# Patient Record
Sex: MALE | Race: WHITE | NOT HISPANIC OR LATINO | Employment: FULL TIME | URBAN - METROPOLITAN AREA
[De-identification: names, ages, dates, MRNs, and addresses within clinical notes are randomized per-mention and may not be internally consistent; named-entity substitution may affect disease eponyms.]

---

## 2017-01-06 RX ORDER — METHYLPREDNISOLONE SODIUM SUCCINATE 125 MG/2ML
125 INJECTION, POWDER, LYOPHILIZED, FOR SOLUTION INTRAMUSCULAR; INTRAVENOUS ONCE
Status: COMPLETED | OUTPATIENT
Start: 2017-01-09 | End: 2017-01-09

## 2017-01-06 RX ORDER — SODIUM CHLORIDE 9 MG/ML
20 INJECTION, SOLUTION INTRAVENOUS ONCE
Status: COMPLETED | OUTPATIENT
Start: 2017-01-09 | End: 2017-01-09

## 2017-01-06 RX ORDER — DIPHENHYDRAMINE HCL 25 MG
25 TABLET ORAL ONCE
Status: COMPLETED | OUTPATIENT
Start: 2017-01-09 | End: 2017-01-09

## 2017-01-06 RX ORDER — ACETAMINOPHEN 325 MG/1
650 TABLET ORAL ONCE
Status: COMPLETED | OUTPATIENT
Start: 2017-01-09 | End: 2017-01-09

## 2017-01-09 ENCOUNTER — HOSPITAL ENCOUNTER (OUTPATIENT)
Dept: INFUSION CENTER | Facility: HOSPITAL | Age: 52
Discharge: HOME | End: 2017-01-09
Payer: COMMERCIAL

## 2017-01-09 VITALS — DIASTOLIC BLOOD PRESSURE: 62 MMHG | TEMPERATURE: 96.2 F | HEART RATE: 72 BPM | SYSTOLIC BLOOD PRESSURE: 114 MMHG

## 2017-01-09 PROCEDURE — 96413 CHEMO IV INFUSION 1 HR: CPT

## 2017-01-09 PROCEDURE — 96415 CHEMO IV INFUSION ADDL HR: CPT

## 2017-01-09 PROCEDURE — 96375 TX/PRO/DX INJ NEW DRUG ADDON: CPT

## 2017-01-09 RX ADMIN — METHYLPREDNISOLONE SODIUM SUCCINATE 125 MG: 125 INJECTION, POWDER, FOR SOLUTION INTRAMUSCULAR; INTRAVENOUS at 08:57

## 2017-01-09 RX ADMIN — SODIUM CHLORIDE 20 ML/HR: 0.9 INJECTION, SOLUTION INTRAVENOUS at 08:57

## 2017-01-09 RX ADMIN — ACETAMINOPHEN 650 MG: 325 TABLET, FILM COATED ORAL at 08:51

## 2017-01-09 RX ADMIN — DIPHENHYDRAMINE HCL 25 MG: 25 TABLET ORAL at 08:52

## 2017-01-09 NOTE — PLAN OF CARE
Problem: Potential for Falls  Goal: Patient will remain free of falls  INTERVENTIONS:  - Assess patient frequently for physical needs  - Identify cognitive and physical deficits and behaviors that affect risk of falls    - [place] fall precautions as indicated by assessment   - Educate patient/family on patient safety including physical limitations  - Instruct patient to call for assistance with activity based on assessment  - Modify environment to reduce risk of injury  - Consider OT/PT consult to assist with strengthening/mobility   Outcome: Progressing

## 2017-01-20 RX ORDER — DIPHENHYDRAMINE HCL 25 MG
25 TABLET ORAL ONCE
Status: COMPLETED | OUTPATIENT
Start: 2017-01-23 | End: 2017-01-23

## 2017-01-20 RX ORDER — METHYLPREDNISOLONE SODIUM SUCCINATE 125 MG/2ML
125 INJECTION, POWDER, LYOPHILIZED, FOR SOLUTION INTRAMUSCULAR; INTRAVENOUS ONCE
Status: COMPLETED | OUTPATIENT
Start: 2017-01-23 | End: 2017-01-23

## 2017-01-20 RX ORDER — ACETAMINOPHEN 325 MG/1
650 TABLET ORAL ONCE
Status: COMPLETED | OUTPATIENT
Start: 2017-01-23 | End: 2017-01-23

## 2017-01-20 RX ORDER — SODIUM CHLORIDE 9 MG/ML
20 INJECTION, SOLUTION INTRAVENOUS ONCE
Status: COMPLETED | OUTPATIENT
Start: 2017-01-23 | End: 2017-01-23

## 2017-01-23 ENCOUNTER — HOSPITAL ENCOUNTER (OUTPATIENT)
Dept: INFUSION CENTER | Facility: HOSPITAL | Age: 52
Discharge: HOME | End: 2017-01-23
Payer: COMMERCIAL

## 2017-01-23 VITALS
TEMPERATURE: 96.6 F | SYSTOLIC BLOOD PRESSURE: 128 MMHG | DIASTOLIC BLOOD PRESSURE: 72 MMHG | RESPIRATION RATE: 18 BRPM | HEART RATE: 77 BPM

## 2017-01-23 PROCEDURE — 96413 CHEMO IV INFUSION 1 HR: CPT

## 2017-01-23 PROCEDURE — 96415 CHEMO IV INFUSION ADDL HR: CPT

## 2017-01-23 PROCEDURE — 96375 TX/PRO/DX INJ NEW DRUG ADDON: CPT

## 2017-01-23 RX ADMIN — DIPHENHYDRAMINE HCL 25 MG: 25 TABLET ORAL at 08:46

## 2017-01-23 RX ADMIN — ACETAMINOPHEN 650 MG: 325 TABLET, FILM COATED ORAL at 08:46

## 2017-01-23 RX ADMIN — SODIUM CHLORIDE 20 ML/HR: 0.9 INJECTION, SOLUTION INTRAVENOUS at 08:59

## 2017-01-23 RX ADMIN — METHYLPREDNISOLONE SODIUM SUCCINATE 125 MG: 125 INJECTION, POWDER, FOR SOLUTION INTRAMUSCULAR; INTRAVENOUS at 08:59

## 2017-01-23 NOTE — PLAN OF CARE
Problem: Potential for Falls  Goal: Patient will remain free of falls  INTERVENTIONS:  - Assess patient frequently for physical needs  - Identify cognitive and physical deficits and behaviors that affect risk of falls    - [place] fall precautions as indicated by assessment   - Educate patient/family on patient safety including physical limitations  - Instruct patient to call for assistance with activity based on assessment  - Modify environment to reduce risk of injury  - Consider OT/PT consult to assist with strengthening/mobility   Outcome: Progressing

## 2017-03-20 ENCOUNTER — ALLSCRIPTS OFFICE VISIT (OUTPATIENT)
Dept: OTHER | Facility: OTHER | Age: 52
End: 2017-03-20

## 2017-03-20 DIAGNOSIS — J84.9 INTERSTITIAL PULMONARY DISEASE (HCC): ICD-10-CM

## 2017-03-20 DIAGNOSIS — M35.00 SICCA SYNDROME (HCC): ICD-10-CM

## 2017-03-22 ENCOUNTER — GENERIC CONVERSION - ENCOUNTER (OUTPATIENT)
Dept: OTHER | Facility: OTHER | Age: 52
End: 2017-03-22

## 2017-03-23 ENCOUNTER — ALLSCRIPTS OFFICE VISIT (OUTPATIENT)
Dept: OTHER | Facility: OTHER | Age: 52
End: 2017-03-23

## 2017-05-22 ENCOUNTER — TRANSCRIBE ORDERS (OUTPATIENT)
Dept: ADMINISTRATIVE | Facility: HOSPITAL | Age: 52
End: 2017-05-22

## 2017-05-22 ENCOUNTER — ALLSCRIPTS OFFICE VISIT (OUTPATIENT)
Dept: OTHER | Facility: OTHER | Age: 52
End: 2017-05-22

## 2017-05-22 DIAGNOSIS — J84.09 SIMPLE PULMONARY ALVEOLITIS (HCC): Primary | ICD-10-CM

## 2017-06-07 ENCOUNTER — GENERIC CONVERSION - ENCOUNTER (OUTPATIENT)
Dept: OTHER | Facility: OTHER | Age: 52
End: 2017-06-07

## 2017-06-07 ENCOUNTER — HOSPITAL ENCOUNTER (OUTPATIENT)
Dept: PULMONOLOGY | Facility: HOSPITAL | Age: 52
Discharge: HOME | End: 2017-06-07
Attending: INTERNAL MEDICINE
Payer: COMMERCIAL

## 2017-06-07 DIAGNOSIS — J84.09 SIMPLE PULMONARY ALVEOLITIS (HCC): ICD-10-CM

## 2017-06-07 PROCEDURE — 94726 PLETHYSMOGRAPHY LUNG VOLUMES: CPT

## 2017-06-07 PROCEDURE — 94729 DIFFUSING CAPACITY: CPT

## 2017-06-07 PROCEDURE — 94760 N-INVAS EAR/PLS OXIMETRY 1: CPT

## 2017-06-07 PROCEDURE — 94060 EVALUATION OF WHEEZING: CPT

## 2017-06-07 RX ORDER — DIPHENHYDRAMINE HCL 25 MG
25 TABLET ORAL ONCE
Status: COMPLETED | OUTPATIENT
Start: 2017-06-08 | End: 2017-06-08

## 2017-06-07 RX ORDER — SODIUM CHLORIDE 9 MG/ML
20 INJECTION, SOLUTION INTRAVENOUS ONCE
Status: COMPLETED | OUTPATIENT
Start: 2017-06-08 | End: 2017-06-08

## 2017-06-07 RX ORDER — METHYLPREDNISOLONE SODIUM SUCCINATE 125 MG/2ML
125 INJECTION, POWDER, LYOPHILIZED, FOR SOLUTION INTRAMUSCULAR; INTRAVENOUS ONCE
Status: COMPLETED | OUTPATIENT
Start: 2017-06-08 | End: 2017-06-08

## 2017-06-07 RX ORDER — ACETAMINOPHEN 325 MG/1
650 TABLET ORAL ONCE
Status: COMPLETED | OUTPATIENT
Start: 2017-06-08 | End: 2017-06-08

## 2017-06-07 RX ORDER — ALBUTEROL SULFATE 2.5 MG/3ML
2.5 SOLUTION RESPIRATORY (INHALATION) ONCE
Status: COMPLETED | OUTPATIENT
Start: 2017-06-07 | End: 2017-06-07

## 2017-06-07 RX ADMIN — ALBUTEROL SULFATE 2.5 MG: 2.5 SOLUTION RESPIRATORY (INHALATION) at 08:55

## 2017-06-08 ENCOUNTER — HOSPITAL ENCOUNTER (OUTPATIENT)
Dept: INFUSION CENTER | Facility: HOSPITAL | Age: 52
Discharge: HOME | End: 2017-06-08
Payer: COMMERCIAL

## 2017-06-08 VITALS
RESPIRATION RATE: 20 BRPM | HEART RATE: 74 BPM | DIASTOLIC BLOOD PRESSURE: 64 MMHG | SYSTOLIC BLOOD PRESSURE: 112 MMHG | TEMPERATURE: 97.5 F

## 2017-06-08 PROCEDURE — 96375 TX/PRO/DX INJ NEW DRUG ADDON: CPT

## 2017-06-08 PROCEDURE — 96415 CHEMO IV INFUSION ADDL HR: CPT

## 2017-06-08 PROCEDURE — 96413 CHEMO IV INFUSION 1 HR: CPT

## 2017-06-08 RX ADMIN — SODIUM CHLORIDE 20 ML/HR: 0.9 INJECTION, SOLUTION INTRAVENOUS at 08:34

## 2017-06-08 RX ADMIN — DIPHENHYDRAMINE HCL 25 MG: 25 TABLET ORAL at 08:35

## 2017-06-08 RX ADMIN — ACETAMINOPHEN 650 MG: 325 TABLET, FILM COATED ORAL at 08:35

## 2017-06-08 RX ADMIN — METHYLPREDNISOLONE SODIUM SUCCINATE 125 MG: 125 INJECTION, POWDER, FOR SOLUTION INTRAMUSCULAR; INTRAVENOUS at 08:35

## 2017-06-08 NOTE — PLAN OF CARE
Problem: Potential for Falls  Goal: Patient will remain free of falls  INTERVENTIONS:  - Assess patient frequently for physical needs  - Identify cognitive and physical deficits and behaviors that affect risk of falls    - [place] fall precautions as indicated by assessment   - Educate patient/family on patient safety including physical limitations  - Instruct patient to call for assistance with activity based on assessment  - Modify environment to reduce risk of injury  - Consider OT/PT consult to assist with strengthening/mobility   Outcome: Progressing

## 2017-06-21 RX ORDER — METHYLPREDNISOLONE SODIUM SUCCINATE 125 MG/2ML
125 INJECTION, POWDER, LYOPHILIZED, FOR SOLUTION INTRAMUSCULAR; INTRAVENOUS ONCE
Status: COMPLETED | OUTPATIENT
Start: 2017-06-22 | End: 2017-06-22

## 2017-06-21 RX ORDER — SODIUM CHLORIDE 9 MG/ML
20 INJECTION, SOLUTION INTRAVENOUS ONCE
Status: COMPLETED | OUTPATIENT
Start: 2017-06-22 | End: 2017-06-22

## 2017-06-21 RX ORDER — DIPHENHYDRAMINE HCL 25 MG
25 TABLET ORAL ONCE
Status: COMPLETED | OUTPATIENT
Start: 2017-06-22 | End: 2017-06-22

## 2017-06-21 RX ORDER — ACETAMINOPHEN 325 MG/1
650 TABLET ORAL ONCE
Status: COMPLETED | OUTPATIENT
Start: 2017-06-22 | End: 2017-06-22

## 2017-06-22 ENCOUNTER — ALLSCRIPTS OFFICE VISIT (OUTPATIENT)
Dept: OTHER | Facility: OTHER | Age: 52
End: 2017-06-22

## 2017-06-22 ENCOUNTER — HOSPITAL ENCOUNTER (OUTPATIENT)
Dept: INFUSION CENTER | Facility: HOSPITAL | Age: 52
Discharge: HOME | End: 2017-06-22
Payer: COMMERCIAL

## 2017-06-22 VITALS
WEIGHT: 164.02 LBS | RESPIRATION RATE: 18 BRPM | SYSTOLIC BLOOD PRESSURE: 140 MMHG | TEMPERATURE: 96.8 F | BODY MASS INDEX: 21.51 KG/M2 | DIASTOLIC BLOOD PRESSURE: 82 MMHG | HEART RATE: 80 BPM

## 2017-06-22 PROCEDURE — 96415 CHEMO IV INFUSION ADDL HR: CPT

## 2017-06-22 PROCEDURE — 96413 CHEMO IV INFUSION 1 HR: CPT

## 2017-06-22 PROCEDURE — 96372 THER/PROPH/DIAG INJ SC/IM: CPT

## 2017-06-22 RX ADMIN — SODIUM CHLORIDE 20 ML/HR: 0.9 INJECTION, SOLUTION INTRAVENOUS at 09:40

## 2017-06-22 RX ADMIN — ACETAMINOPHEN 650 MG: 325 TABLET, FILM COATED ORAL at 09:45

## 2017-06-22 RX ADMIN — METHYLPREDNISOLONE SODIUM SUCCINATE 125 MG: 125 INJECTION, POWDER, FOR SOLUTION INTRAMUSCULAR; INTRAVENOUS at 09:46

## 2017-06-22 RX ADMIN — DIPHENHYDRAMINE HCL 25 MG: 25 TABLET ORAL at 09:45

## 2017-08-04 RX ORDER — TAMSULOSIN HYDROCHLORIDE 0.4 MG/1
0.4 CAPSULE ORAL
COMMUNITY
End: 2018-03-23

## 2017-08-05 ENCOUNTER — ANESTHESIA EVENT (OUTPATIENT)
Dept: GASTROENTEROLOGY | Facility: HOSPITAL | Age: 52
End: 2017-08-05
Payer: COMMERCIAL

## 2017-08-07 ENCOUNTER — HOSPITAL ENCOUNTER (OUTPATIENT)
Facility: HOSPITAL | Age: 52
Setting detail: OUTPATIENT SURGERY
Discharge: HOME | End: 2017-08-07
Attending: INTERNAL MEDICINE | Admitting: INTERNAL MEDICINE
Payer: COMMERCIAL

## 2017-08-07 ENCOUNTER — ANESTHESIA (OUTPATIENT)
Dept: GASTROENTEROLOGY | Facility: HOSPITAL | Age: 52
End: 2017-08-07
Payer: COMMERCIAL

## 2017-08-07 VITALS
DIASTOLIC BLOOD PRESSURE: 81 MMHG | WEIGHT: 160 LBS | HEIGHT: 74 IN | RESPIRATION RATE: 12 BRPM | TEMPERATURE: 97.3 F | SYSTOLIC BLOOD PRESSURE: 131 MMHG | OXYGEN SATURATION: 100 % | BODY MASS INDEX: 20.53 KG/M2 | HEART RATE: 73 BPM

## 2017-08-07 RX ORDER — PROPOFOL 10 MG/ML
INJECTION, EMULSION INTRAVENOUS AS NEEDED
Status: DISCONTINUED | OUTPATIENT
Start: 2017-08-07 | End: 2017-08-07 | Stop reason: SURG

## 2017-08-07 RX ORDER — SODIUM CHLORIDE 9 MG/ML
125 INJECTION, SOLUTION INTRAVENOUS CONTINUOUS
Status: DISCONTINUED | OUTPATIENT
Start: 2017-08-07 | End: 2017-08-07 | Stop reason: HOSPADM

## 2017-08-07 RX ORDER — LIDOCAINE HYDROCHLORIDE 10 MG/ML
INJECTION, SOLUTION INFILTRATION; PERINEURAL AS NEEDED
Status: DISCONTINUED | OUTPATIENT
Start: 2017-08-07 | End: 2017-08-07 | Stop reason: SURG

## 2017-08-07 RX ADMIN — PROPOFOL 20 MG: 10 INJECTION, EMULSION INTRAVENOUS at 08:05

## 2017-08-07 RX ADMIN — SODIUM CHLORIDE 125 ML/HR: 0.9 INJECTION, SOLUTION INTRAVENOUS at 07:34

## 2017-08-07 RX ADMIN — PROPOFOL 80 MG: 10 INJECTION, EMULSION INTRAVENOUS at 08:03

## 2017-08-07 RX ADMIN — LIDOCAINE HYDROCHLORIDE 20 MG: 10 INJECTION, SOLUTION INFILTRATION; PERINEURAL at 08:03

## 2017-09-21 LAB
25(OH)D3 SERPL-MCNC: 37 NG/ML (ref 30–100)
A/G RATIO (HISTORICAL): 2.2 (CALC) (ref 1–2.5)
ALBUMIN SERPL BCP-MCNC: 4.3 G/DL (ref 3.6–5.1)
ALP SERPL-CCNC: 54 U/L (ref 40–115)
ALT SERPL W P-5'-P-CCNC: 13 U/L (ref 9–46)
AST SERPL W P-5'-P-CCNC: 21 U/L (ref 10–35)
BASOPHILS # BLD AUTO: 1.3 %
BASOPHILS # BLD AUTO: 51 CELLS/UL (ref 0–200)
BILIRUB SERPL-MCNC: 0.6 MG/DL (ref 0.2–1.2)
BUN SERPL-MCNC: 21 MG/DL (ref 7–25)
BUN/CREA RATIO (HISTORICAL): NORMAL (CALC) (ref 6–22)
CALCIUM (ADJUSTED FOR ALBUMIN) (HISTORICAL): 9.6 MG/DL (CALC) (ref 8.6–10.2)
CALCIUM SERPL-MCNC: 9.5 MG/DL (ref 8.6–10.3)
CHLORIDE SERPL-SCNC: 104 MMOL/L (ref 98–110)
CHOLEST SERPL-MCNC: 150 MG/DL
CHOLEST/HDLC SERPL: 2.4 (CALC)
CO2 SERPL-SCNC: 30 MMOL/L (ref 20–31)
CREAT SERPL-MCNC: 0.97 MG/DL (ref 0.7–1.33)
DEPRECATED RDW RBC AUTO: 12.9 % (ref 11–15)
EGFR AFRICAN AMERICAN (HISTORICAL): 104 ML/MIN/1.73M2
EGFR-AMERICAN CALC (HISTORICAL): 89 ML/MIN/1.73M2
EOSINOPHIL # BLD AUTO: 160 CELLS/UL (ref 15–500)
EOSINOPHIL # BLD AUTO: 4.1 %
EST. AVERAGE GLUCOSE BLD GHB EST-MCNC: 5.2 (CALC)
EST. AVERAGE GLUCOSE BLD GHB EST-MCNC: 94 (CALC)
GAMMA GLOBULIN (HISTORICAL): 2 G/DL (CALC) (ref 1.9–3.7)
GLUCOSE (HISTORICAL): 89 MG/DL (ref 65–99)
HBA1C MFR BLD HPLC: 4.9 % OF TOTAL HGB
HCT VFR BLD AUTO: 45.1 % (ref 38.5–50)
HDLC SERPL-MCNC: 62 MG/DL
HGB BLD-MCNC: 15.3 G/DL (ref 13.2–17.1)
LDL CHOLESTEROL (HISTORICAL): 73 MG/DL (CALC)
LYMPHOCYTES # BLD AUTO: 1057 CELLS/UL (ref 850–3900)
LYMPHOCYTES # BLD AUTO: 27.1 %
MCH RBC QN AUTO: 30.1 PG (ref 27–33)
MCHC RBC AUTO-ENTMCNC: 33.9 G/DL (ref 32–36)
MCV RBC AUTO: 88.6 FL (ref 80–100)
MONOCYTES # BLD AUTO: 593 CELLS/UL (ref 200–950)
MONOCYTES (HISTORICAL): 15.2 %
NEUTROPHILS # BLD AUTO: 2040 CELLS/UL (ref 1500–7800)
NEUTROPHILS # BLD AUTO: 52.3 %
NON-HDL-CHOL (CHOL-HDL) (HISTORICAL): 88 MG/DL (CALC)
PLATELET # BLD AUTO: 240 THOUSAND/UL (ref 140–400)
PMV BLD AUTO: 9.9 FL (ref 7.5–12.5)
POTASSIUM SERPL-SCNC: 4.7 MMOL/L (ref 3.5–5.3)
RBC # BLD AUTO: 5.09 MILLION/UL (ref 4.2–5.8)
SODIUM SERPL-SCNC: 140 MMOL/L (ref 135–146)
TOTAL PROTEIN (HISTORICAL): 6.3 G/DL (ref 6.1–8.1)
TRIGL SERPL-MCNC: 72 MG/DL
TSH SERPL DL<=0.05 MIU/L-ACNC: 1.39 MIU/L (ref 0.4–4.5)
WBC # BLD AUTO: 3.9 THOUSAND/UL (ref 3.8–10.8)

## 2017-09-25 ENCOUNTER — ALLSCRIPTS OFFICE VISIT (OUTPATIENT)
Dept: OTHER | Facility: OTHER | Age: 52
End: 2017-09-25

## 2017-09-25 DIAGNOSIS — M50.320 OTHER CERVICAL DISC DEGENERATION, MID-CERVICAL REGION, UNSPECIFIED LEVEL: ICD-10-CM

## 2017-09-25 DIAGNOSIS — R05.9 COUGH: ICD-10-CM

## 2017-10-03 ENCOUNTER — APPOINTMENT (OUTPATIENT)
Dept: PHYSICAL THERAPY | Facility: CLINIC | Age: 52
End: 2017-10-03
Payer: COMMERCIAL

## 2017-10-03 DIAGNOSIS — M50.320 OTHER CERVICAL DISC DEGENERATION, MID-CERVICAL REGION, UNSPECIFIED LEVEL: ICD-10-CM

## 2017-10-03 PROCEDURE — 97161 PT EVAL LOW COMPLEX 20 MIN: CPT

## 2017-10-03 PROCEDURE — G8979 MOBILITY GOAL STATUS: HCPCS

## 2017-10-03 PROCEDURE — G8978 MOBILITY CURRENT STATUS: HCPCS

## 2017-10-05 ENCOUNTER — GENERIC CONVERSION - ENCOUNTER (OUTPATIENT)
Dept: OTHER | Facility: OTHER | Age: 52
End: 2017-10-05

## 2017-10-05 ENCOUNTER — APPOINTMENT (OUTPATIENT)
Dept: PHYSICAL THERAPY | Facility: CLINIC | Age: 52
End: 2017-10-05
Payer: COMMERCIAL

## 2017-10-05 PROCEDURE — 97110 THERAPEUTIC EXERCISES: CPT

## 2017-10-05 PROCEDURE — 97010 HOT OR COLD PACKS THERAPY: CPT

## 2017-10-05 PROCEDURE — 97140 MANUAL THERAPY 1/> REGIONS: CPT

## 2017-10-09 ENCOUNTER — TRANSCRIBE ORDERS (OUTPATIENT)
Dept: ADMINISTRATIVE | Facility: HOSPITAL | Age: 52
End: 2017-10-09

## 2017-10-09 ENCOUNTER — APPOINTMENT (OUTPATIENT)
Dept: RADIOLOGY | Facility: MEDICAL CENTER | Age: 52
End: 2017-10-09
Payer: COMMERCIAL

## 2017-10-09 ENCOUNTER — ALLSCRIPTS OFFICE VISIT (OUTPATIENT)
Dept: OTHER | Facility: OTHER | Age: 52
End: 2017-10-09

## 2017-10-09 DIAGNOSIS — R05.9 COUGH: ICD-10-CM

## 2017-10-09 PROCEDURE — 71020 HB CHEST X-RAY 2VW FRONTAL&LATL: CPT

## 2017-10-10 ENCOUNTER — APPOINTMENT (OUTPATIENT)
Dept: PHYSICAL THERAPY | Facility: CLINIC | Age: 52
End: 2017-10-10
Payer: COMMERCIAL

## 2017-10-10 PROCEDURE — 97010 HOT OR COLD PACKS THERAPY: CPT

## 2017-10-10 PROCEDURE — 97140 MANUAL THERAPY 1/> REGIONS: CPT

## 2017-10-10 PROCEDURE — 97110 THERAPEUTIC EXERCISES: CPT

## 2017-10-12 ENCOUNTER — GENERIC CONVERSION - ENCOUNTER (OUTPATIENT)
Dept: OTHER | Facility: OTHER | Age: 52
End: 2017-10-12

## 2017-10-12 ENCOUNTER — APPOINTMENT (OUTPATIENT)
Dept: PHYSICAL THERAPY | Facility: CLINIC | Age: 52
End: 2017-10-12
Payer: COMMERCIAL

## 2017-10-12 PROCEDURE — 97140 MANUAL THERAPY 1/> REGIONS: CPT

## 2017-10-12 PROCEDURE — 97010 HOT OR COLD PACKS THERAPY: CPT

## 2017-10-12 PROCEDURE — 97110 THERAPEUTIC EXERCISES: CPT

## 2017-10-13 NOTE — PROGRESS NOTES
Assessment  1  Persistent cough (786 2) (R05)    Plan  ILD (interstitial lung disease), Persistent cough    · Advair Diskus 250-50 MCG/DOSE Inhalation Aerosol Powder Breath Activated;  inhale 1 puff twice daily  Persistent cough    · Montelukast Sodium 10 MG Oral Tablet; Take 1 tablet daily   · * XR CHEST PA & LATERAL; Status:Active; Requested BRD:[id number];     Discussion/Summary    Patient is a 46year old male with persistent cough  He had a sore throat and cold symptoms, but now the cough only persists  He does take medication for Sjogrens  His lungs are clear, but I did ask him to have a chest x -ray just to be sure  I gave him a sample of Advair and prescribe Montelukast for the cough  Possible side effects of new medications were reviewed with the patient/guardian today  The treatment plan was reviewed with the patient/guardian  The patient/guardian understands and agrees with the treatment plan      Chief Complaint  Pt c/o cold x2 wks  Pt states that he has been coughing, has nasal anni and post nasal drip  Pt has been drinking warm water w honey which provides some relief  Pt has colonoscopy scheduled for next month and has not had flu shot yet  History of Present Illness  HPI: Patient with cough - comes and goes - sometimes productive, sometimes dry  Did have a sore throat  He is concerned since he is on medication for Sjogren's  Review of Systems    Constitutional: no fever-and-no chills  ENT: nasal discharge  Cardiovascular: no complaints of slow or fast heart rate, no chest pain, no palpitations, no leg claudication or lower extremity edema  Respiratory: cough  Gastrointestinal: no complaints of abdominal pain, no constipation, no nausea or vomiting, no diarrhea or bloody stools  Active Problems  1  Benign essential hypertension (401 1) (I10)   2  Cardiomyopathy (425 4) (I42 9)   3  Decreased libido (799 81) (R68 82)   4   Degeneration of intervertebral disc of mid-cervical region (722 4) (M50 320)   5  Enlarged prostate without lower urinary tract symptoms (luts) (600 00) (N40 0)   6  Erectile dysfunction (607 84) (N52 9)   7  Hyperlipidemia (272 4) (E78 5)   8  ILD (interstitial lung disease) (515) (J84 9)   9  Lateral epicondylitis of right elbow (726 32) (M77 11)   10  Long term (current) use of systemic steroids (V58 65) (Z79 52)   11  Long-term use of hydroxychloroquine (V58 69) (Z79 899)   12  Mild mitral regurgitation (424 0) (I34 0)   13  NSIP (nonspecific interstitial pneumonitis) (516 8) (J84 89)   14  Sjogren's syndrome (710 2) (M35 00)   15  Vitamin D deficiency (268 9) (E55 9)    Past Medical History  1  History of Allergic Reaction (995 3)   2  History of Allergic rhinitis (477 9) (J30 9)   3  History of Chest pain (786 50) (R07 9)   4  History of Chronic steroid use (V58 65)   5  History of Colon cancer screening (V76 51) (Z12 11)   6  History of Constipation (564 00) (K59 00)   7  History of Edema (782 3) (R60 9)   8  History of Encounter for monitoring rituximab therapy (V58 83,V58 69) (Z51 81,Z79 899)   9  History of Esophageal reflux (530 81) (K21 9)   10  History of Herniated cervical disc (722 0) (M50 20)   11  History of hyperlipidemia (V12 29) (Z86 39)   12  History of hypertension (V12 59) (Z86 79)   13  History of shortness of breath (V13 89) (Z87 898)   14  History of Sjogren's disease (V13 59) (Z87 39)   15  History of Insomnia (780 52) (G47 00)   16  History of Myalgia (729 1) (M79 1)   17  History of Need for influenza vaccination (V04 81) (Z23)   18  History of Need for pneumococcal vaccination (V03 82) (Z23)   19  History of Oral thrush (112 0) (B37 0)   20  History of Orthostatic hypotension (458 0) (I95 1)   21  History of Pre-syncope (780 2) (R55)   22  History of Pruritus (698 9) (L29 9)   23  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)   24  History of Pulmonary disease (518 89) (J98 4)   25   History of Screening for colon cancer (V76 51) (Z12 11)   26  History of Special screening examination for neoplasm of prostate (V76 44) (Z12 5)   27  History of Spondylosis of cervical region without myelopathy or radiculopathy (721 0)    (M47 812)   28  History of Staggering gait (781 2) (R26 0)   29  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,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,T38 0X5A)   30  History of Testicular hypogonadism (257 2) (E29 1)   31  History of Transient Visual Loss (368 12)   32  History of Tremor (781 0) (R25 1)   33  History of Type 2 diabetes mellitus (250 00) (E11 9)   34  History of Type 2 diabetes mellitus with hyperglycemia (250 00) (E11 65)   35  History of Urgency of urination (788 63) (R39 15)   36  History of Weight loss (783 21) (R63 4)  Active Problems And Past Medical History Reviewed: The active problems and past medical history were reviewed and updated today  Family History  Mother    1  Family history of Diabetes Mellitus (V18 0)   2  Family history of Hypertension (V17 49)  Father    3  Family history of Hypertension (V17 49)  Sister    4  Family history of arthritis (V17 7) (Z82 61)   5  Family history of malignant neoplasm of breast (V16 3) (Z80 3)  Family History    6  Family history of Family Health Status 2  Children Living   7  Family history of Family Health Status Of Brother - Alive   8  Family history of Family Health Status Of Father - Alive   5  Family history of Family Health Status Of Mother - Alive   8  Family history of Family Health Status Of Sister - Alive   6  Denied: Family history of Crohn's disease   12  Denied: Family history of psoriasis   13  Denied: Family history of rheumatoid arthritis   14  Denied: Family history of Sjogren's disease   13  Denied: Family history of systemic lupus erythematosus   16   Denied: Family history of ulcerative colitis    Social History   · Being A Social Drinker   · Rare   · Daily Coffee Consumption (1  Cups/Day)   · Denied: History of Drug Use   · Educational Level - Completed 2nd Year College   · Living Independently With Spouse   · & (2) children   · Marital History - Currently    · Never A Smoker   · No drug use   · Occupation:   · Sr  [employer]    · Sexually Active   · Working Full Time   ·   The social history was reviewed and updated today  The social history was reviewed and is unchanged  Surgical History  1  History of Oral Surgery Tooth Extraction   2  History of Tonsillectomy   3  History of Wedge Resection Of Lung    Current Meds   1  Atorvastatin Calcium 20 MG Oral Tablet; take 1 tablet every day; Therapy: 04QQS1915 to (Evaluate:33Dba1008)  Requested for: 82Zor7287; Last   Rx:18Kjs1893 Ordered   2  Hydroxychloroquine Sulfate 200 MG Oral Tablet; take 1 tablet twice daily after meals    Requested for: 20Mar2017; Last Rx:20Mar2017 Ordered   3  Methocarbamol 750 MG Oral Tablet; take 1 tablet every 6 hours as needed; Therapy: 03Jop6418 to (Evaluate:32Fcf3752)  Requested for: 82ZVS6914; Last   Rx:42Fha1893 Ordered   4  Rituxan 10 MG/ML CONC; USE AS DIRECTED; Therapy: 98KWG1014 to Recorded   5  Tamsulosin HCl - 0 4 MG Oral Capsule; TAKE 1 CAPSULE AT BEDTIME; Therapy: 83Prb7766 to (Evaluate:69Nul9238)  Requested for: 06HCI1617; Last   MV:58BNE8641 Ordered   6  Viagra 100 MG Oral Tablet; TAKE 1 TABLET DAILY 1 HOUR BEFORE NEEDED; Therapy: 31DPK2084 to (Evaluate:09Mar2015); Last Rx:03Mar2015 Ordered    The medication list was reviewed and updated today  Allergies  1  CellCept TABS   2  Avelox TABS   3  Axiron SOLN   4  Imuran TABS   5   Zithromax PACK    Vitals   Recorded: 10NDG5172 12:57PM   Temperature 96 9 F, Tympanic   Heart Rate 89   Pulse Quality Normal   Respiration Quality Normal   Respiration 18   Systolic 163, RUE, Sitting   Diastolic 86, RUE, Sitting   Height 6 ft 3 in   Weight 163 lb 6 oz   BMI Calculated 20 42   BSA Calculated 2 01   O2 Saturation 99     Physical Exam    Constitutional   General appearance: No acute distress, well appearing and well nourished  Eyes   Conjunctiva and lids: No swelling, erythema, or discharge  Ears, Nose, Mouth, and Throat   External inspection of ears and nose: Normal     Otoscopic examination: Tympanic membrance translucent with normal light reflex  Canals patent without erythema  Oropharynx: Abnormal  -with clear post nasal drainage  Pulmonary   Respiratory effort: No increased work of breathing or signs of respiratory distress  Auscultation of lungs: Clear to auscultation, equal breath sounds bilaterally, no wheezes, no rales, no rhonci  Cardiovascular   Auscultation of heart: Normal rate and rhythm, normal S1 and S2, without murmurs  Lymphatic   Palpation of lymph nodes in neck: No lymphadenopathy      Psychiatric   Orientation to person, place and time: Normal     Mood and affect: Normal          Future Appointments    Date/Time Provider Specialty Site   03/26/2018 08:15 AM Negra Jewell DO Family Medicine [address]   11/06/2017 02:00 PM Susan Champion [facility] Rheumatology [address]   11/08/2017 08:15 AM Montse Schafer DO Pulmonary Medicine ST [address] [place] [address]     Signatures   Electronically signed by : Scott Lacy DO; Oct 12 2017  6:00AM EST                       (Author)

## 2017-10-17 ENCOUNTER — APPOINTMENT (OUTPATIENT)
Dept: PHYSICAL THERAPY | Facility: CLINIC | Age: 52
End: 2017-10-17
Payer: COMMERCIAL

## 2017-10-17 PROCEDURE — 97140 MANUAL THERAPY 1/> REGIONS: CPT

## 2017-10-17 PROCEDURE — 97110 THERAPEUTIC EXERCISES: CPT

## 2017-10-19 ENCOUNTER — APPOINTMENT (OUTPATIENT)
Dept: PHYSICAL THERAPY | Facility: CLINIC | Age: 52
End: 2017-10-19
Payer: COMMERCIAL

## 2017-10-23 ENCOUNTER — APPOINTMENT (OUTPATIENT)
Dept: PHYSICAL THERAPY | Facility: CLINIC | Age: 52
End: 2017-10-23
Payer: COMMERCIAL

## 2017-10-23 PROCEDURE — 97110 THERAPEUTIC EXERCISES: CPT

## 2017-10-23 PROCEDURE — 97140 MANUAL THERAPY 1/> REGIONS: CPT

## 2017-10-26 ENCOUNTER — APPOINTMENT (OUTPATIENT)
Dept: PHYSICAL THERAPY | Facility: CLINIC | Age: 52
End: 2017-10-26
Payer: COMMERCIAL

## 2017-10-31 ENCOUNTER — APPOINTMENT (OUTPATIENT)
Dept: PHYSICAL THERAPY | Facility: CLINIC | Age: 52
End: 2017-10-31
Payer: COMMERCIAL

## 2017-10-31 PROCEDURE — 97110 THERAPEUTIC EXERCISES: CPT

## 2017-10-31 PROCEDURE — 97140 MANUAL THERAPY 1/> REGIONS: CPT

## 2017-11-06 ENCOUNTER — ALLSCRIPTS OFFICE VISIT (OUTPATIENT)
Dept: OTHER | Facility: OTHER | Age: 52
End: 2017-11-06

## 2017-11-06 DIAGNOSIS — J84.9 INTERSTITIAL PULMONARY DISEASE (HCC): ICD-10-CM

## 2017-11-06 DIAGNOSIS — M35.00 SICCA SYNDROME (HCC): ICD-10-CM

## 2017-11-08 ENCOUNTER — ALLSCRIPTS OFFICE VISIT (OUTPATIENT)
Dept: OTHER | Facility: OTHER | Age: 52
End: 2017-11-08

## 2017-11-09 NOTE — PROGRESS NOTES
Assessment    1  ILD (interstitial lung disease) (515) (J84 9)   2  NSIP (nonspecific interstitial pneumonitis) (516 8) (J84 89)   3  Sjogren's syndrome (710 2) (M35 00)   4  Need for influenza vaccination (V04 81) (Z23)    Plan  Need for influenza vaccination    · Fluzone Quadrivalent 0 5 ML Intramuscular Suspension Prefilled Syringe;INJECT 0 5  ML Intramuscular; To Be Done: 77SQD8143   For: Need for influenza vaccination; Ordered By:Mary Pompa; Effective Date:08Nov2017  NSIP (nonspecific interstitial pneumonitis)    · Spirometry w Diffusion Capacity Assessment; Status:Active; Requested CAZ:[id number]; Perform:[facility]; BKQ:94SSQ3173;MXEUZNF;(UAQPPODVXYI interstitial pneumonitis); Ordered By:Jules Pompa;    Results/Data  PFT Results v2:     Spirometry:   Post Bronchodilator Spirometry:   Lung Volumes:   DLCO:   PFT Interpretation:  Pulmonary function testing from 06/07/2017 shows an FEV1/FVC ratio of 79% with an FEV1 of 101% of predicted and FVC of 99% of predicted  No change with bronchodilators  Total lung capacity 108% of predicted  Residual volume 119% of predicted  Diffusion capacity 67% of predicted which is up from 60% of predicted in 2015 and 50% of predicted in 2013  Discussion/Summary  Discussion Summary:   Parish Gage is doing extremely well from pulmonary perspective  He has no significant limitation related to underlying ILD  He will continue with activity level as tolerated  He will continue with Rituxan per Rheumatology  We will plan to repeat spirometry with diffusion capacity in June and follow-up in the office thereafter  Flu vaccine given today  Counseling Documentation With Imm: The patient was counseled regarding diagnostic results,-- instructions for management  Goals and Barriers: The patient has the current Goals: Maintain pulmonary status  The patent has the current Barriers: None  Patient's Capacity to Self-Care: Patient is able to Self-Care        Active Problems    1  Benign essential hypertension (401 1) (I10)   2  Cardiomyopathy (425 4) (I42 9)   3  Decreased libido (799 81) (R68 82)   4  Degeneration of intervertebral disc of mid-cervical region (722 4) (M50 320)   5  Encounter for monitoring rituximab therapy (V58 83,V58 69) (Z51 81,Z79 899)   6  Enlarged prostate without lower urinary tract symptoms (luts) (600 00) (N40 0)   7  Erectile dysfunction (607 84) (N52 9)   8  Hyperlipidemia (272 4) (E78 5)   9  ILD (interstitial lung disease) (515) (J84 9)   10  Lateral epicondylitis of right elbow (726 32) (M77 11)   11  Long term (current) use of systemic steroids (V58 65) (Z79 52)   12  Long-term use of hydroxychloroquine (V58 69) (Z79 899)   13  Mild mitral regurgitation (424 0) (I34 0)   14  NSIP (nonspecific interstitial pneumonitis) (516 8) (J84 89)   15  Persistent cough (786 2) (R05)   16  Sjogren's syndrome (710 2) (M35 00)   17  Vitamin D deficiency (268 9) (E55 9)    History of Present Illness  HPI: Reji Noriega is here today for follow-up regarding NSIP/ILD  He is doing extremely well from pulmonary perspective  He had a brief episode of cough and was back on Advair for about a month  Otherwise, he has no cough, wheeze or sputum production  He has been cycling up to 35 miles per week  He has no significant limitation with this activity  He has been getting Rituxan infusions every 6 months and seems to be stable without regimen  Joint pains have significantly improved since increasing his activity level  Review of Systems  Complete-Male Pulm:  Constitutional: No fever or chills, feels well, no tiredness, no recent weight gain or weight loss  Cardiovascular: no palpitations, no chest pain  Respiratory: as noted in HPI  Gastrointestinal: no complaints of esophageal reflux, no abdominal pain  Musculoskeletal: as noted in HPI  Past Medical History    1  History of Allergic Reaction (995 3)   2  History of Allergic rhinitis (477 9) (J30 9)   3   History of Chest pain (786 50) (R07 9)   4  History of Chronic steroid use (V58 65)   5  History of Colon cancer screening (V76 51) (Z12 11)   6  History of Constipation (564 00) (K59 00)   7  History of Edema (782 3) (R60 9)   8  History of Esophageal reflux (530 81) (K21 9)   9  History of Herniated cervical disc (722 0) (M50 20)   10  History of hyperlipidemia (V12 29) (Z86 39)   11  History of hypertension (V12 59) (Z86 79)   12  History of shortness of breath (V13 89) (Z87 898)   13  History of Sjogren's disease (V13 59) (Z87 39)   14  Denied: History of substance abuse   15  History of Insomnia (780 52) (G47 00)   16  Denied: History of Mental health problem   17  History of Myalgia (729 1) (M79 1)   18  History of Need for pneumococcal vaccination (V03 82) (Z23)   19  History of Oral thrush (112 0) (B37 0)   20  History of Orthostatic hypotension (458 0) (I95 1)   21  History of Pre-syncope (780 2) (R55)   22  History of Pruritus (698 9) (L29 9)   23  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)   24  History of Pulmonary disease (518 89) (J98 4)   25  History of Screening for colon cancer (V76 51) (Z12 11)   26  History of Special screening examination for neoplasm of prostate (V76 44) (Z12 5)   27  History of Spondylosis of cervical region without myelopathy or radiculopathy (721 0)  (M47 812)   28  History of Staggering gait (781 2) (R26 0)   29  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,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,T38 0X5A)   30  History of Testicular hypogonadism (257 2) (E29 1)   31  History of Transient Visual Loss (368 12)   32  History of Tremor (781 0) (R25 1)   33  History of Type 2 diabetes mellitus (250 00) (E11 9)   34  History of Type 2 diabetes mellitus with hyperglycemia (250 00) (E11 65)   35  History of Urgency of urination (788 63) (R39 15)   36  History of Weight loss (783 21) (R63 4)    Surgical History  1  History of Oral Surgery Tooth Extraction   2  History of Tonsillectomy   3   History of Wedge Resection Of Lung  Surgical History Reviewed: The surgical history was reviewed and updated today  Family History  Mother    1  Family history of Diabetes Mellitus (V18 0)   2  Denied: Family history of substance abuse   3  Family history of Hypertension (V17 49)   4  Denied: Family history of Mental health problem  Father    5  Denied: Family history of substance abuse   6  Family history of Hypertension (V17 49)   7  Denied: Family history of Mental health problem  Sister    8  Family history of arthritis (V17 7) (Z82 61)   9  Family history of malignant neoplasm of breast (V16 3) (Z80 3)  Family History    10  Family history of Family Health Status 2  Children Living   11  Family history of Family Health Status Of Brother - Alive   12  Family history of Family Health Status Of Father - Alive   15  Family history of Family Health Status Of Mother - Alive   15  Family history of Family Health Status Of Sister - Alive   13  Denied: Family history of Crohn's disease   12  Denied: Family history of psoriasis   17  Denied: Family history of rheumatoid arthritis   18  Denied: Family history of Sjogren's disease   23  Denied: Family history of systemic lupus erythematosus   20  Denied: Family history of ulcerative colitis  Family History Reviewed: The family history was reviewed and updated today  Social History     · Being A Social Drinker   · Rare   · Daily Coffee Consumption (1  Cups/Day)   · Denied: History of Drug Use   · Educational Level - Completed 2nd [employer] [facility] [employer]   · Living Independently With Spouse   · & (2) children   · Marital History - Currently    · Never A Smoker   · No drug use   · Occupation:   · Sr  [employer]    · Sexually Active   · Working Full Time   ·   Social History Reviewed: The social history was reviewed and is unchanged  Current Meds   1  Atorvastatin Calcium 20 MG Oral Tablet; take 1 tablet every day;  Therapy: 23Cep5080 to (Evaluate:71Jgr3722)  Requested for: 19Tdg7957; Last Rx:00Drq8089 Ordered   2  Hydroxychloroquine Sulfate 200 MG Oral Tablet; take 1 tablet twice daily after meals; Therapy: 50BTP9512 to (Evaluate:20Bgs3037)  Requested for: 79NMM1780; Last Rx:07Bxq4293 Ordered   3  Methocarbamol 750 MG Oral Tablet; take 1 tablet every 6 hours as needed; Therapy: 46Ibz2075 to (Evaluate:86Poj9994)  Requested for: 36SMY9981; Last Rx:07May2017 Ordered   4  Rituxan 10 MG/ML CONC; USE AS DIRECTED; Therapy: 40ZMF0126 to Recorded   5  Tamsulosin HCl - 0 4 MG Oral Capsule; TAKE 1 CAPSULE AT BEDTIME; Therapy: 67Ebk4840 to (Evaluate:61Xyn6130)  Requested for: 42ZIS1561; Last OV:81VWO3764 Ordered   6  Viagra 100 MG Oral Tablet; TAKE 1 TABLET DAILY 1 HOUR BEFORE NEEDED; Therapy: 70XAL3465 to (Evaluate:09Mar2015); Last Rx:03Mar2015 Ordered  Medication List Reviewed: The medication list was reviewed and updated today  Allergies  1  CellCept TABS   2  Avelox TABS   3  Axiron SOLN   4  Imuran TABS   5  Zithromax PACK    Vitals  Vital Signs    Recorded: 60WWI7028 08:22AM   Temperature 96 3 F   Heart Rate 78   Respiration 14   Systolic 253   Diastolic 78   Height 6 ft 3 in   Weight 162 lb 2 oz   BMI Calculated 20 26   BSA Calculated 2 01   O2 Saturation 98, RA       Physical Exam   Constitutional  General appearance: No acute distress, well appearing and well nourished  Pulmonary  Respiratory effort: No increased work of breathing or signs of respiratory distress  Auscultation of lungs: Clear to auscultation, no rales, no crackles, no wheezing  Cardiovascular  Auscultation of heart: Normal rate and rhythm, normal S1 and S2, no murmurs  Musculoskeletal  Gait and station: Normal    Neurologic  Mental Status: Normal  Not confused, no evidence of dementia, good comprehension, good concentration  Psychiatric  Mood and affect: Normal        Health Management  History of diabetes mellitus   *VB - Eye Exam; every 1 year; Last 94OSZ2482;  Next Due: 68OUG7867; Overdue  *VB - Foot Exam; every 1 year; Next Due: 88ODS7179; Overdue    Thank you very much for allowing me to participate in the care of this patient  If you have any questions, please do not hesitate to contact me        Future Appointments    Date/Time Provider Specialty Site   03/26/2018 08:15 AM Randee Cockayne, DO Family Medicine [address]   03/06/2018 09:00 AM Margarito Ross [facility] Rheumatology [facility] [address] [facility]       Signatures   Electronically signed by : Day Gong DO; Nov 8 2017  8:49AM EST                       (Author)

## 2017-11-19 ENCOUNTER — ANESTHESIA EVENT (OUTPATIENT)
Dept: GASTROENTEROLOGY | Facility: HOSPITAL | Age: 52
End: 2017-11-19
Payer: COMMERCIAL

## 2017-11-20 ENCOUNTER — GENERIC CONVERSION - ENCOUNTER (OUTPATIENT)
Dept: OTHER | Facility: OTHER | Age: 52
End: 2017-11-20

## 2017-11-20 ENCOUNTER — ANESTHESIA (OUTPATIENT)
Dept: GASTROENTEROLOGY | Facility: HOSPITAL | Age: 52
End: 2017-11-20
Payer: COMMERCIAL

## 2017-11-20 ENCOUNTER — HOSPITAL ENCOUNTER (OUTPATIENT)
Facility: HOSPITAL | Age: 52
Setting detail: OUTPATIENT SURGERY
Discharge: HOME | End: 2017-11-20
Attending: INTERNAL MEDICINE | Admitting: INTERNAL MEDICINE
Payer: COMMERCIAL

## 2017-11-20 VITALS
SYSTOLIC BLOOD PRESSURE: 129 MMHG | TEMPERATURE: 97.4 F | DIASTOLIC BLOOD PRESSURE: 76 MMHG | HEIGHT: 74 IN | BODY MASS INDEX: 20.53 KG/M2 | HEART RATE: 75 BPM | OXYGEN SATURATION: 100 % | WEIGHT: 160 LBS | RESPIRATION RATE: 14 BRPM

## 2017-11-20 DIAGNOSIS — Z12.11 ENCOUNTER FOR SCREENING FOR MALIGNANT NEOPLASM OF COLON: ICD-10-CM

## 2017-11-20 PROCEDURE — 88305 TISSUE EXAM BY PATHOLOGIST: CPT | Performed by: INTERNAL MEDICINE

## 2017-11-20 RX ORDER — SODIUM CHLORIDE 9 MG/ML
125 INJECTION, SOLUTION INTRAVENOUS CONTINUOUS
Status: DISCONTINUED | OUTPATIENT
Start: 2017-11-20 | End: 2017-11-20 | Stop reason: HOSPADM

## 2017-11-20 RX ORDER — LIDOCAINE HYDROCHLORIDE 20 MG/ML
INJECTION, SOLUTION INFILTRATION; PERINEURAL AS NEEDED
Status: DISCONTINUED | OUTPATIENT
Start: 2017-11-20 | End: 2017-11-20 | Stop reason: SURG

## 2017-11-20 RX ORDER — ONDANSETRON 2 MG/ML
4 INJECTION INTRAMUSCULAR; INTRAVENOUS EVERY 6 HOURS PRN
Status: DISCONTINUED | OUTPATIENT
Start: 2017-11-20 | End: 2017-11-20 | Stop reason: HOSPADM

## 2017-11-20 RX ORDER — PROPOFOL 10 MG/ML
INJECTION, EMULSION INTRAVENOUS AS NEEDED
Status: DISCONTINUED | OUTPATIENT
Start: 2017-11-20 | End: 2017-11-20 | Stop reason: SURG

## 2017-11-20 RX ADMIN — PROPOFOL 50 MG: 10 INJECTION, EMULSION INTRAVENOUS at 09:04

## 2017-11-20 RX ADMIN — PROPOFOL 50 MG: 10 INJECTION, EMULSION INTRAVENOUS at 09:08

## 2017-11-20 RX ADMIN — PROPOFOL 50 MG: 10 INJECTION, EMULSION INTRAVENOUS at 09:00

## 2017-11-20 RX ADMIN — PROPOFOL 100 MG: 10 INJECTION, EMULSION INTRAVENOUS at 08:55

## 2017-11-20 RX ADMIN — LIDOCAINE HYDROCHLORIDE 3 ML: 20 INJECTION, SOLUTION INFILTRATION; PERINEURAL at 08:55

## 2017-11-20 RX ADMIN — PROPOFOL 50 MG: 10 INJECTION, EMULSION INTRAVENOUS at 08:57

## 2017-11-20 RX ADMIN — SODIUM CHLORIDE 125 ML/HR: 0.9 INJECTION, SOLUTION INTRAVENOUS at 08:15

## 2017-11-20 NOTE — DISCHARGE INSTRUCTIONS
Please call [phone] with any problems  I would suggest repeat colonoscopy in 5 years  Colorectal Polyps   WHAT YOU NEED TO KNOW:   Colorectal polyps are small growths of tissue in the lining of the colon and rectum  Most polyps are hyperplastic polyps and are usually benign (noncancerous)  Certain types of polyps, called adenomatous polyps, may turn into cancer  DISCHARGE INSTRUCTIONS:   Follow up with your healthcare provider or gastroenterologist as directed: You may need to return for more tests, such as another colonoscopy  Write down your questions so you remember to ask them during your visits  Reduce your risk for colorectal polyps:   · Eat a variety of healthy foods:  Healthy foods include fruit, vegetables, whole-grain breads, low-fat dairy products, beans, lean meat, and fish  Ask if you need to be on a special diet  · Maintain a healthy weight:  Ask your healthcare provider if you need to lose weight and how much you need to lose  Ask for help with a weight loss program     · Exercise:  Begin to exercise slowly and do more as you get stronger  Talk with your healthcare provider before you start an exercise program      · Limit alcohol:  Your risk for polyps increases the more you drink  · Do not smoke: If you smoke, it is never too late to quit  Ask for information about how to stop  For support and more information:   · Malika Tovar [facility]) [address], [place] [address]  Phone: [phone] 088 [phone]  Web Address: www digestive  niddk nih gov  Contact your healthcare provider or gastroenterologist if:   · You have a fever  · You have chills, a cough, or feel weak and achy  · You have abdominal pain that does not go away or gets worse after you take medicine  · Your abdomen is swollen  · You are losing weight without trying  · You have questions or concerns about your condition or care    Seek care immediately or call

## 2017-11-20 NOTE — ANESTHESIA PREPROCEDURE EVALUATION
Review of Systems/Medical History  Patient summary reviewed  Chart reviewed      Cardiovascular  Exercise tolerance: good,  Hyperlipidemia, Hypertension controlled,    Pulmonary  Pneumonia, ,   Comment: Interstitial lung disease     GI/Hepatic  Negative GI/hepatic ROS          Negative  ROS        Endo/Other  Negative endo/other ROS      GYN  Negative gynecology ROS          Hematology  Negative hematology ROS      Musculoskeletal  Negative musculoskeletal ROS        Neurology  Negative neurology ROS      Psychology           Physical Exam    Airway    Mallampati score: II  TM Distance: <3 FB  Neck ROM: full     Dental       Cardiovascular  Rhythm: regular, Rate: normal,     Pulmonary  Breath sounds clear to auscultation,     Other Findings        Anesthesia Plan  ASA Score- 2       Anesthesia Type- IV sedation with anesthesia with ASA Monitors  Additional Monitors:   Airway Plan:           Induction- intravenous  Informed Consent- Anesthetic plan and risks discussed with patient

## 2017-11-20 NOTE — PRE-PROCEDURE INSTRUCTIONS
Endo process reviewed with pt  And spouse  Call bell in reach  Pt  Comfortable  Encouraged to use call bell with any questions or needs

## 2017-11-20 NOTE — OP NOTE
OPERATIVE REPORT  PATIENT NAME: Ludwin Green    :  1965  MRN: [id number]  Pt Location: [facility] 01    SURGERY DATE: 2017    Surgeon(s) and Role:     Prem Gomez MD - Primary    Preop Diagnosis:  Encounter for screening for malignant neoplasm of colon [Z12 11]    Post-Op Diagnosis Codes:     * Encounter for screening for malignant neoplasm of colon [Z12 11]    Procedure(s) (LRB):  COLONOSCOPY (N/A)    Specimen(s):  ID Type Source Tests Collected by Time Destination   1 : polyp recto-sigmoid  colon Tissue Large Intestine, Sigmoid Colon TISSUE EXAM Ryan Feng IV, MD 2017 [id number]        Estimated Blood Loss:   Minimal    Drains:       Anesthesia Type:   IV Sedation with Anesthesia    Operative Indications:  Encounter for screening for malignant neoplasm of colon [Z12 11]      Operative Findings: The colonoscope introduced and passed through a tortuous colon to the cecum  The preparation was good  The colonoscope was carefully withdrawn and the rectus sigmoid tiny 0 2 cm polyp is completely removed with 1 pass of a biopsy forceps  No other polyps were seen the mucosa was normal he tolerated the procedure without apparent complication    I have suggested repeat colonoscopy in 5 years especially considering his immune suppression      Complications:   None    Procedure and Technique:   I was present for the entire procedure    Patient Disposition:  hemodynamically stable    SIGNATURE: Zaida Chinchilla MD  DATE: 2017  TIME: 9:15 AM

## 2017-11-20 NOTE — PROGRESS NOTES
D/C instructions given and pt verbalizes understanding  Dr Nguyen Speaker here to talk with pt and wife  OOB to ambuklate, gait steady denies dizziness  Denies need to use BR

## 2018-01-12 NOTE — PROGRESS NOTES
Assessment    1  Sjogren's syndrome (710 2) (M35 00)   2  ILD (interstitial lung disease) (515) (J84 9)   3  Encounter for monitoring rituximab therapy (V58 83,V58 69) (Z51 81,Z79 899)   4  Long term (current) use of systemic steroids (V58 65) (Z79 52)   5  Long-term use of hydroxychloroquine (V58 69) (Z79 899)   6  Benign essential hypertension (401 1) (I10)   7  Cardiomyopathy (425 4) (I42 9)    Plan    1  (1) CBC/PLT/DIFF; Status:Active; Requested for:20Mar2017;    2  (1) COMPREHENSIVE METABOLIC PANEL; Status:Active; Requested for:20Mar2017;    3  (1) C-REACTIVE PROTEIN; Status:Active; Requested for:20Mar2017;    4  (1) SED RATE; Status:Active; Requested for:20Mar2017;    5  Call [phone] if: The pain seems worse ; Status:Complete;   Done: 33IVI0465   6  Call [phone] if: The symptoms seem worse ; Status:Complete;   Done:   83YGF0669   7  Call [phone] if: You have questions or concerns about your problem ;   Status:Complete;   Done: 34YSO3114   8  Follow-up visit in 3 months Evaluation and Treatment  Follow-up  Status: Complete    Done: 25JQM8469    9  Hydroxychloroquine Sulfate 200 MG Oral Tablet; take 1 tablet twice daily after   meals    10  Rituxan 10 MG/ML CONC; USE AS DIRECTED    Discussion/Summary    This is a 51-year-old gentleman presenting today for Sjogren syndrome with interstitial lung disease  Patient states he did have infusions of Rituxan in mid January and for 2 months he had no symptoms at all  Most recently he has noticed some labored breathing with activity especially when he is tired at the end of the day  He states that he is off prednisone completely and does utilize hydroxychloroquine with Rituxan every 4 months  He is seeing pulmonology in May  He does have some low back pain which he attributes to bending while fixing his   The patient states he does have periodic low back pain with certain activities   He denies any further joint pain and denies any obvious joint swelling  He states that he is doing cardio exercises more frequently throughout the week  He is no morning stiffness and denies difficulty with sleep but does have occasional nonrestorative sleep and fatigue  He does utilize Tylenol for his low back pain with some relief  He states that he did have an eye exam a year ago and is due for another exam at this time  On physical examination, there is no active synovitis  Patient's lungs are clear to auscultation  Patient has normal passive range of motion of both upper and lower extremities  The patient has crepitus of bilateral knees  At this time patient's history and physical examination is most consistent with Sjogren syndrome with interstitial lung disease which appears to be mildly active but stable at this time with Rituxan infusions every 4 months  Patient is utilizing hydroxychloroquine and he will schedule for his next eye exam  He states he has felt the best he has felt in the last 4 years at this time  He is off of prednisone at this time  We will plan to obtain a CBC, CMP, CRP, and ESR  Patient will proceed to his next Rituxan infusions in mid May as well as follow-up with pulmonology  Patient will plan to return to the office in 3 months time however will contact the office in the interim if he has any further questions or concerns  Counseling  Rheumatology Counseling Documentation: The patient was counseled regarding instructions for management, prognosis, patient and family education, risks and benefits of treatment options and importance of compliance with treatment  Chief Complaint  F/U Sjogren's/ILD   Patient is here today for follow up of chronic conditions described in HPI  History of Present Illness  Patient is in the office today for follow up for Sjogren's with ILD  Had infusion in January 17 x 2 months and no symptoms at all  Recently with some breathing changes with activity when tired  Rituxan 4 month cycle   Seeing pulm in May  Still with HCQ  Pain in the low back while bending without radiation  No other joint pain or obvious joint swelling  Doing cardio as well  No Am stiffness  No difficulty with sleep  +Occasional non restorative sleep and fatigue  Taking Tylenol for low back pain with some relief  Last eye exam: Due for exam        RAPID3: 3 0/30      Review of Systems    Constitutional: "night sweat" x 2 a week ago, fatigue and chills, but no fever, no recent weight gain, no recent weight loss and no anorexia  HEENT: feeling congested, but no blurred vision, no double vision, no amaurosis fugax, no dryness of the eyes, no eye pain, no erythema eye(s), no dryness mouth, no mouth sores and no sore throat  Cardiovascular: dyspnea on exertion, but no chest pain or pressure and no swelling in the arms or legs  Respiratory: no shortness of breath and no pleurisy    The patient presents with complaints of unusual or persistent cough (+clear sputum)  Gastrointestinal: no abdominal pain, no nausea, no vomiting, no heartburn, no diarrhea, no constipation, no melena and no BRBPR  Genitourinary: No foamy urine, but no hematuria    The patient presents with complaints of dysuria (+burning )  Musculoskeletal: as noted in HPI  Integumentary nail changes, but no rash, no Raynaud's, no alopecia and no photosensitivity  Endocrine no polyuria and no polydipsia  Hematologic/Lymphatic: no unusual bleeding and no tendency for easy bruising  Neurological: no headache, no vertigo or dizziness, no tingling and no weakness  Psychiatric: non-restorative sleep, but no insomnia  Active Problems    1  Benign essential hypertension (401 1) (I10)   2  Cardiomyopathy (425 4) (I42 9)   3  Decreased libido (799 81) (R68 82)   4  Diabetes mellitus (250 00) (E11 9)   5  Encounter for monitoring rituximab therapy (V58 83,V58 69) (Z51 81,Z79 899)   6   Enlarged prostate without lower urinary tract symptoms (luts) (600 00) (N40 0) 7  Erectile dysfunction (607 84) (N52 9)   8  Hyperlipidemia (272 4) (E78 5)   9  ILD (interstitial lung disease) (515) (J84 9)   10  Lateral epicondylitis of right elbow (726 32) (M77 11)   11  Long term (current) use of systemic steroids (V58 65) (Z79 52)   12  Long-term use of hydroxychloroquine (V58 69) (Z79 899)   13  Mild mitral regurgitation (424 0) (I34 0)   14  Need for influenza vaccination (V04 81) (Z23)   15  NSIP (nonspecific interstitial pneumonitis) (516 8) (J84 89)   16  Sjogren's syndrome (710 2) (M35 00)   17  Vitamin D deficiency (268 9) (E55 9)    Past Medical History    1  History of Allergic Reaction (995 3)   2  History of Allergic rhinitis (477 9) (J30 9)   3  History of Chest pain (786 50) (R07 9)   4  History of Chronic steroid use (V58 65)   5  History of Constipation (564 00) (K59 00)   6  History of Edema (782 3) (R60 9)   7  History of Esophageal reflux (530 81) (K21 9)   8  History of Herniated cervical disc (722 0) (M50 20)   9  History of hyperlipidemia (V12 29) (Z86 39)   10  History of hypertension (V12 59) (Z86 79)   11  History of shortness of breath (V13 89) (Z87 898)   12  History of Sjogren's disease (V13 59) (Z87 39)   13  History of Insomnia (780 52) (G47 00)   14  History of Myalgia (729 1) (M79 1)   15  History of Need for pneumococcal vaccination (V03 82) (Z23)   16  History of Oral thrush (112 0) (B37 0)   17  History of Orthostatic hypotension (458 0) (I95 1)   18  History of Pre-syncope (780 2) (R55)   19  History of Pruritus (698 9) (L29 9)   20  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)   21  History of Pulmonary disease (518 89) (J98 4)   22  History of Screening for colon cancer (V76 51) (Z12 11)   23  History of Special screening examination for neoplasm of prostate (V76 44) (Z12 5)   24  History of Spondylosis of cervical region without myelopathy or radiculopathy (721 0)    (M47 812)   25  History of Staggering gait (781 2) (R26 0)   26   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,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,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)   27  History of Testicular hypogonadism (257 2) (E29 1)   28  History of Transient Visual Loss (368 12)   29  History of Tremor (781 0) (R25 1)   30  History of Type 2 diabetes mellitus (250 00) (E11 9)   31  History of Type 2 diabetes mellitus with hyperglycemia (250 00) (E11 65)   32  History of Urgency of urination (788 63) (R39 15)   33  History of Weight loss (783 21) (R63 4)    The active problems and past medical history were reviewed and updated today  Surgical History    1  History of Oral Surgery Tooth Extraction   2  History of Tonsillectomy   3  History of Wedge Resection Of Lung    The surgical history was reviewed and updated today  Family History  Mother    1  Family history of Diabetes Mellitus (V18 0)   2  Family history of Hypertension (V17 49)  Father    3  Family history of Hypertension (V17 49)  Sister    4  Family history of arthritis (V17 7) (Z82 61)   5  Family history of malignant neoplasm of breast (V16 3) (Z80 3)  Family History    6  Family history of Family Health Status 2  Children Living   7  Family history of Family Health Status Of Brother - Alive   8  Family history of Family Health Status Of Father - Alive   5  Family history of Family Health Status Of Mother - Alive   8  Family history of Family Health Status Of Sister - Alive   6  Denied: Family history of Crohn's disease   12  Denied: Family history of psoriasis   13  Denied: Family history of rheumatoid arthritis   14  Denied: Family history of Sjogren's disease   13  Denied: Family history of systemic lupus erythematosus   16  Denied: Family history of ulcerative colitis    The family history was reviewed and updated today         Social History    · Being A Social Drinker   · Daily Coffee Consumption (1  Cups/Day)   · Denied: History of Drug Use   · Educational Level - Completed 2nd [employer]   · Living Independently With Spouse   · Marital History - Currently    · Never A Smoker   · No drug use   · Occupation:   · Sexually Active   · Working Full Time  The social history was reviewed and updated today  The social history was reviewed and is unchanged  Current Meds   1  Atorvastatin Calcium 20 MG Oral Tablet; take 1 tablet every day; Therapy: 46IKG7531 to (Evaluate:2017)  Requested for: 23PRL4227; Last   Rx:2017 Ordered   2  Hydroxychloroquine Sulfate 200 MG Oral Tablet; take 1 tablet twice daily after meals    Requested for: 23EBG0128; Last FW:18SPD3184 Ordered   3  Methocarbamol 750 MG Oral Tablet; TAKE 1 TABLET EVERY 6 HOURS AS NEEDED; Therapy: 44Qkn5419 to (Last Rx:05Cqc5929)  Requested for: 2015 Ordered   4  Rituxan 10 MG/ML CONC; USE AS DIRECTED; Therapy: 65CQI0220 to Recorded   5  Tamsulosin HCl - 0 4 MG Oral Capsule; TAKE 1 CAPSULE Bedtime; Therapy: 77DEX4413 to (Evaluate:2017)  Requested for: 16CJN5321; Last   Rx:22Edi8164 Ordered   6  Viagra 100 MG Oral Tablet; TAKE 1 TABLET DAILY 1 HOUR BEFORE NEEDED; Therapy: 88BUF2285 to (Evaluate:2015); Last Rx:2015 Ordered    Immunizations  Influenza --- George [id number] Elpidio: 2014; Series3: 04-Aug-2016; Radha Duty:  06-Oct-2016; Series5: 01-Sep-2012   PCV --- Series1: 16-Oct-2015   PPSV --- Series1: 01-Aug-2012     Allergies    1  CellCept TABS   2  Avelox TABS   3  Axiron SOLN   4  Imuran TABS   5  Zithromax PACK    Vitals  Signs   Recorded: 2017 08:58AM   Heart Rate: 84  Systolic: 445  Diastolic: 78  Weight: 514 lb 4 oz  BMI Calculated: 20 83  BSA Calculated: 1 99    Physical Exam    Constitutional   General appearance: No acute distress, well appearing and well nourished  Eyes   Conjunctiva and lids: No swelling, erythema or discharge  Pupils and irises: Equal, round and reactive to light  Ears, Nose, Mouth, and Throat   External inspection of ears and nose: Normal     Oropharynx: Abnormal   Oral mucosa was dry     Pulmonary Respiratory effort: No increased work of breathing or signs of respiratory distress  Auscultation of lungs: Clear to auscultation  Cardiovascular   Auscultation of heart: Normal rate and rhythm, normal S1 and S2, without murmurs  Examination of extremities for edema and/or varicosities: Normal     Lymphatic   Palpation of lymph nodes in neck: No lymphadenopathy  Psychiatric   Orientation to person, place, and time: Normal     Mood and affect: Normal         Right Upper Extremity: Normal ROM  Left Upper Extremity: Normal ROM  Right Lower Extremity: Normal ROM  Left Lower Extremity: Normal ROM  Musculoskeletal - Joints, bones, and muscles: Abnormal  Palpation - bilateral knee crepitus  Right hand: All MCP, PIP and DIP joints are normal  Left Hand: All MCP, PIP and DIP joints are normal    Right foot: All MTP, PIP and DIP joints are normal  Left foot: All MTP, PIP and DIP joints are normal       Health Management  Diabetes mellitus   *VB - Eye Exam; every 1 year; Last 65SYO1422; Next Due: 66SVF6215; Overdue  *VB - Foot Exam; every 1 year; Next Due: 15BZC2519; Overdue    Attending Note  Collaborating Physician: I did not interview and examine the patient, I discussed the case with the Advanced Practitioner and reviewed the note and I agree with the Advanced Practitioner note        Future Appointments    Date/Time Provider Specialty Site   03/23/2017 08:15 AM Femi Peres DO Family Medicine [address]   06/22/2017 08:20 AM Kelli Narvaez DO [facility]umatology [facility]   05/22/2017 09:40 AM Valery Aopdaca DO Pulmonary Medicine [address] [facility]   Electronically signed by : MADISON Nuno; Mar 20 2017  9:23AM EST                       (Author)    Electronically signed by : Hanane Warner DO; Mar 20 2017  9:31AM EST                       (Author)

## 2018-01-12 NOTE — PROGRESS NOTES
Assessment   1  Sjogren's syndrome (710 2) (M35 00)  2  ILD (interstitial lung disease) (515) (J84 9)  3  Vitamin D deficiency (268 9) (E55 9)  4  Long term (current) use of systemic steroids (V58 65) (Z79 52)  5  Cardiomyopathy (425 4) (I42 9)  6  Benign essential hypertension (401 1) (I10)  7  Encounter for monitoring rituximab therapy (V58 83,V58 69) (Z51 81,Z79 899)    Plan   1  (1) CBC/PLT/DIFF; Status:Active; Requested QAL:96RBB0326;   2  (1) COMPREHENSIVE METABOLIC PANEL; Status:Active; Requested RRJ:56TYK8261;   3  (1) C-REACTIVE PROTEIN; Status:Active; Requested JUZ:72RWF0875;   4  (1) SED RATE; Status:Active; Requested QFV:79DEA6774;   5  Call [phone] if: The pain seems worse ; Status:Complete;   Done: 79QKA3847  6  Call [phone] if: The symptoms seem worse ; Status:Complete;   Done:   92NNH4811  7  Call [phone] if: You have questions or concerns about your problem ;   Status:Complete;   Done: 00AFR3084  8  Follow-up visit in 4 Months Evaluation and Treatment  Follow-up  Status: Complete    Done: 46YIU6105   9  Continue: Hydroxychloroquine Sulfate 200 MG Oral Tablet; take 1 tablet twice daily after   meals   10  Continue: Rituxan 10 MG/ML CONC; USE AS DIRECTED    Discussion/Summary    This is a 49-year-old gentleman presenting today for follow-up for Sjogren's syndrome as well as interstitial lung disease  Patient states that he's been feeling very well  He states that he is overdue for his Rituxan infusion that he was receiving every 4 months however since he's been feeling very well he would like to hold off on his infusion until December  He states he does notice some increasing irritability when he is due for his infusion however he still would like to wait until December to proceed  He denies any joint pain or swelling at this time and has not had any shortness of breath or difficulty with breathing   He states that he has no difficulty with sleep, morning stiffness, non-restorative sleep, or fatigue  He states that his last eye exam was over a year ago and he will schedule appointment  He does continue to utilize hydroxychloroquine in addition to Rituxan infusions  His last infusions were in June  In addition he did also seek treatment with physical therapy for neck pain which has improved  On physical examination, there is no active synovitis  Patient has normal passive range of motion of both upper and lower extremities with crepitus of the knees  At this time patient's history and physical examination is most consistent with Sjogren syndrome with ILD which appears to be stable at this time with his current medication regimen  He will continue with hydroxychloroquine and plan to schedule a routine eye exam  In addition we will plan to proceed with Rituxan infusions in December  She will contact the infusion center to schedule his infusions  We will obtain a CBC, CMP, CRP, and ESR before his next infusion  He will see his pulmonologist later this week  We will plan to return to the office in 4 months time however contact the office in the interim if he has any further questions or concerns  The patient has the current Goals: Keep ILD stable  The patent has the current Barriers: None at this time  Patient is able to Self-Care  Counseling  Rheumatology Counseling Documentation: The patient was counseled regarding instructions for management, prognosis, patient and family education, risks and benefits of treatment options and importance of compliance with treatment  Chief Complaint  F/U Sjogren's/ILD   Patient is here today for follow up of chronic conditions described in HPI  History of Present Illness  Patient is in the office today for follow up for Sjogren's/ILD  30-35 miles per week riding bike  Due for Rituxan, and noticing irritability  no joint pain or swelling  No SOB abnormalities  no difficulty with sleep  No Am stiffness  No non restorative sleep  No fatigue  Last infusions were Tammy  Seen at PT for neck pain and helped  Last eye exam: over due  RAPID3: 3 0 /30      Review of Systems    Constitutional: fatigue, but no fever, no recent weight gain, no chills, no recent weight loss and no anorexia  HEENT: no blurred vision, no double vision, no amaurosis fugax, no dryness of the eyes, no eye pain, no erythema eye(s), no dryness mouth, no mouth sores, not feeling congested and no sore throat  Cardiovascular: dyspnea on exertion, but no chest pain or pressure and no swelling in the arms or legs  Respiratory: no shortness of breath and no pleurisy  Gastrointestinal: no abdominal pain, no nausea, no vomiting, no heartburn, no diarrhea, no constipation, no melena and no BRBPR  Genitourinary: No foamy urine, but no hematuria    The patient presents with complaints of no dysuria (+burning )  Musculoskeletal: as noted in HPI  Integumentary no rash, no Raynaud's, no alopecia, no nail changes and no photosensitivity  Endocrine no polyuria and no polydipsia  Hematologic/Lymphatic: no unusual bleeding and no tendency for easy bruising  Neurological: no headache, no vertigo or dizziness, no tingling and no weakness  Psychiatric: non-restorative sleep, but no insomnia  Active Problems   1  Benign essential hypertension (401 1) (I10)  2  Cardiomyopathy (425 4) (I42 9)  3  Decreased libido (799 81) (R68 82)  4  Degeneration of intervertebral disc of mid-cervical region (722 4) (M50 320)  5  Enlarged prostate without lower urinary tract symptoms (luts) (600 00) (N40 0)  6  Erectile dysfunction (607 84) (N52 9)  7  Hyperlipidemia (272 4) (E78 5)  8  Lateral epicondylitis of right elbow (726 32) (M77 11)  9  Long term (current) use of systemic steroids (V58 65) (Z79 52)  10  Long-term use of hydroxychloroquine (V58 69) (Z79 899)  11  Mild mitral regurgitation (424 0) (I34 0)  12  Persistent cough (786 2) (R05)  13   Vitamin D deficiency (268 9) (E55 9)    Past Medical History   1  History of Allergic Reaction (995 3)  2  History of Allergic rhinitis (477 9) (J30 9)  3  History of Chest pain (786 50) (R07 9)  4  History of Chronic steroid use (V58 65)  5  History of Colon cancer screening (V76 51) (Z12 11)  6  History of Constipation (564 00) (K59 00)  7  History of Edema (782 3) (R60 9)  8  History of Esophageal reflux (530 81) (K21 9)  9  History of Herniated cervical disc (722 0) (M50 20)  10  History of hyperlipidemia (V12 29) (Z86 39)  11  History of hypertension (V12 59) (Z86 79)  12  History of shortness of breath (V13 89) (Z87 898)  13  History of Sjogren's disease (V13 59) (Z87 39)  14  Denied: History of substance abuse  15  History of Insomnia (780 52) (G47 00)  16  Denied: History of Mental health problem  17  History of Myalgia (729 1) (M79 1)  18  History of Need for pneumococcal vaccination (V03 82) (Z23)  19  History of Oral thrush (112 0) (B37 0)  20  History of Orthostatic hypotension (458 0) (I95 1)  21  History of Pre-syncope (780 2) (R55)  22  History of Pruritus (698 9) (L29 9)  23  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)  24  History of Pulmonary disease (518 89) (J98 4)  25  History of Screening for colon cancer (V76 51) (Z12 11)  26  History of Special screening examination for neoplasm of prostate (V76 44) (Z12 5)  27  History of Spondylosis of cervical region without myelopathy or radiculopathy (721 0)    (M47 812)  28  History of Staggering gait (781 2) (R26 0)  29  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,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,T38 0X5A)  30  History of Testicular hypogonadism (257 2) (E29 1)  31  History of Transient Visual Loss (368 12)  32  History of Tremor (781 0) (R25 1)  33  History of Type 2 diabetes mellitus (250 00) (E11 9)  34  History of Type 2 diabetes mellitus with hyperglycemia (250 00) (E11 65)  35  History of Urgency of urination (788 63) (R39 15)  36   History of Weight loss (783 21) (R63 4)    The active problems and past medical history were reviewed and updated today  Surgical History   1  History of Oral Surgery Tooth Extraction  2  History of Tonsillectomy  3  History of Wedge Resection Of Lung    The surgical history was reviewed and updated today  Family History  Mother   1  Family history of Diabetes Mellitus (V18 0)  2  Denied: Family history of substance abuse  3  Family history of Hypertension (V17 49)  4  Denied: Family history of Mental health problem  Father   5  Denied: Family history of substance abuse  6  Family history of Hypertension (V17 49)  7  Denied: Family history of Mental health problem  Sister   8  Family history of arthritis (V17 7) (Z82 61)  9  Family history of malignant neoplasm of breast (V16 3) (Z80 3)  Family History   10  Family history of Family Health Status 2  Children Living  11  Family history of Family Health Status Of Brother - Alive  12  Family history of Family Health Status Of Father - Alive  15  Family history of Family Health Status Of Mother - Alive  15  Family history of Family Health Status Of Sister - Alive  13  Denied: Family history of Crohn's disease  12  Denied: Family history of psoriasis  17  Denied: Family history of rheumatoid arthritis  18  Denied: Family history of Sjogren's disease  23  Denied: Family history of systemic lupus erythematosus  20  Denied: Family history of ulcerative colitis    The family history was reviewed and updated today  Social History    · Being A Social Drinker   · Daily Coffee Consumption (1  Cups/Day)   · Denied: History of Drug Use   · Educational Level - [facility] [employer]   · Living Independently With Spouse   · Marital History - Currently    · Never A Smoker   · No drug use   · Occupation:   · Sexually Active   · Working Full Time  The social history was reviewed and updated today  The social history was reviewed and is unchanged  Current Meds  1   Advair Diskus 250-50 MCG/DOSE Inhalation Aerosol Powder Breath Activated; inhale 1   puff twice daily; Therapy: 54GWM9535 to (Last Rx:09Oct2017) Ordered  2  Atorvastatin Calcium 20 MG Oral Tablet; take 1 tablet every day; Therapy: 00JXW3379 to (Evaluate:42Qpc8076)  Requested for: 40Fkx3757; Last   Rx:69Kgy0282 Ordered  3  Hydroxychloroquine Sulfate 200 MG Oral Tablet; take 1 tablet twice daily after meals; Therapy: 94EFF3685 to (Evaluate:56Zde5020)  Requested for: 56OSO3234; Last   Rx:74Gyo3600 Ordered  4  Methocarbamol 750 MG Oral Tablet; take 1 tablet every 6 hours as needed; Therapy: 66Hpw7492 to (Evaluate:77Pgg8454)  Requested for: 36YLQ4379; Last   Rx:96Drp5095 Ordered  5  Montelukast Sodium 10 MG Oral Tablet; Take 1 tablet daily; Therapy: 27CKA3789 to (21 )  Requested for: 08NDO4618; Last   Rx:09Oct2017 Ordered  6  Rituxan 10 MG/ML CONC; USE AS DIRECTED; Therapy: 74OYK7506 to Recorded  7  Tamsulosin HCl - 0 4 MG Oral Capsule; TAKE 1 CAPSULE AT BEDTIME; Therapy: 60Wqg0859 to (Evaluate:43Fao9168)  Requested for: 34MLX6919; Last   LL:96XJJ4903 Ordered  8  Viagra 100 MG Oral Tablet; TAKE 1 TABLET DAILY 1 HOUR BEFORE NEEDED; Therapy: 56EPR4167 to (Evaluate:09Mar2015); Last Rx:03Mar2015 Ordered    The medication list was reviewed and updated today  Immunizations  Influenza --- Jamison Naikon: [id number] Splinter: Nov 2014; Series3: 04-Aug-2016; Christopher Beaver:  06-Oct-2016; Series5: Sep-2012   PCV --- Series1: 16-Oct-2015   PPSV --- Series1: Aug-2012     Allergies   1  CellCept TABS  2  Avelox TABS  3  Axiron SOLN  4  Imuran TABS  5  Zithromax PACK    Vitals  Signs   Recorded: 78CIX2899 01:23PM   Heart Rate: 84  Systolic: 387  Diastolic: 76  Height: 6 ft 3 in  Weight: 162 lb   BMI Calculated: 20 25  BSA Calculated: 2 01    Physical Exam    Constitutional   General appearance: No acute distress, well appearing and well nourished  Eyes   Conjunctiva and lids: No swelling, erythema or discharge      Pupils and irises: Equal, round and reactive to light  Ears, Nose, Mouth, and Throat   External inspection of ears and nose: Normal     Oropharynx: Abnormal   Oral mucosa was dry  Pulmonary   Respiratory effort: No increased work of breathing or signs of respiratory distress  Auscultation of lungs: Clear to auscultation  Cardiovascular   Auscultation of heart: Normal rate and rhythm, normal S1 and S2, without murmurs  Examination of extremities for edema and/or varicosities: Normal     Lymphatic   Palpation of lymph nodes in neck: No lymphadenopathy  Psychiatric   Orientation to person, place, and time: Normal     Mood and affect: Normal         Right Upper Extremity: Normal ROM  Left Upper Extremity: Normal ROM  Right Lower Extremity: Normal ROM  Left Lower Extremity: Normal ROM  Musculoskeletal - Joints, bones, and muscles: Abnormal  Palpation - bilateral knee crepitus  Right hand: All MCP, PIP and DIP joints are normal  Left Hand: All MCP, PIP and DIP joints are normal    Right foot: All MTP, PIP and DIP joints are normal  Left foot: All MTP, PIP and DIP joints are normal       Health Management  History of diabetes mellitus   *VB - Eye Exam; every 1 year; Last 11MRB0591; Next Due: 09DOH8066; Overdue  *VB - Foot Exam; every 1 year; Next Due: 82IBE1269; Overdue    Future Appointments    Date/Time Provider Specialty Site   03/26/2018 08:15 AM Abraham Simmons [facility] [address] [place] [address]   03/06/2018 09:00 AM Clementine Kidd, [facility] Rheumat[facility] [address] [facility]     Signatures   Electronically signed by : Derik Doty, [facility]; Nov 6 2017  2:08PM EST                       (Author)    Electronically signed by : GERRY Call ; Nov 24 2017 12:13PM EST                       (Author)    Electronically signed by :  GERRY Call ; Nov 24 2017 12:13PM EST                       (Author)

## 2018-01-12 NOTE — PROGRESS NOTES
Assessment    1  Sjogren's syndrome (710 2) (M35 00)   2  ILD (interstitial lung disease) (515) (J84 9)   3  Encounter for monitoring rituximab therapy (V58 83,V58 69) (Z51 81,Z79 899)   4  Long term (current) use of systemic steroids (V58 65) (Z79 52)   5  Long-term use of hydroxychloroquine (V58 69) (Z79 899)   6  Benign essential hypertension (401 1) (I10)   7  Cardiomyopathy (425 4) (I42 9)    Plan    1  Follow-up visit in 2 months Evaluation and Treatment  Follow-up  Status: Complete    Done: 80KYJ9784    2  Hydroxychloroquine Sulfate 200 MG Oral Tablet; take 1 tablet twice daily after   meals   3  Call [phone] if: The pain seems worse ; Status:Complete;   Done: 53ZVI4473   4  Call [phone] if: You have questions or concerns about your problem ;   Status:Complete;   Done: 40RIM3591    5  PredniSONE 2 5 MG Oral Tablet; Take 1 tablet daily   6  Rituxan 10 MG/ML CONC; USE AS DIRECTED    Discussion/Summary    Mr Jovita Mercedes has been feeling significantly better since his last Rituxan infusion on June 16  He is currently feeling generally well, and only complains of some occasional discomfort in his hips and elbows  He states that his pain is usually present when he is sitting in a car for prolonged periods of time, which is mainly localized to his hips  He has not been taking any medications for his pain, and he does report that the pain resolves after walking for a bit  He denies any obvious joint swelling  He denies any morning stiffness or difficulty sleeping  He does report occasional nonrestorative sleep, as well as occasional fatigue  On exam, there is no active synovitis  His lungs are clear to auscultation  His mucus memories do appear slightly dry  A recent CBC and CMP from his primary care physician were within normal limits  At this time, his Sjogren syndrome with ILD does appear improved with the Rituxan infusions, and he does seem to do best with the every 4 month cycle   We will plan to readminister the Rituxan around October 16 of this year  I will reevaluate him in about 2 months just prior to the next infusions to ensure that he is still doing well  He will call in the interim if there are any questions or concerns  Counseling  Rheumatology Counseling Documentation: The patient was counseled regarding instructions for management and impressions  Chief Complaint  F/U Sjogren's and ILD   Patient is here today for follow up of chronic conditions described in HPI  History of Present Illness  Pt  returns for F/U for Sjogren's and ILD  Feeling much better since receiving Rituxan again  Feeling generally well  c/o occasional pain in hips and elbows  Pain is usually present when sitting in a car for some time -> mainly in hips  No pain meds taken  Pain resolves after walking a bit  No obvious joint swelling  No AM stiffness  No difficulty sleeping  + occasional non-restorative sleep  + occasional fatigue  Last eye exam in 6/2016  Last Rituxan infusion 6/16/16  RAPID3: 3 3/30      Review of Systems    Constitutional: no fever, no recent weight gain, no chills, no recent weight loss and no anorexia    The patient presents with complaints of occasional episodes of fatigue  HEENT: blurred vision, but no double vision, no amaurosis fugax, no dryness of the eyes, no eye pain, no erythema eye(s), no dryness mouth, no mouth sores, not feeling congested and no sore throat  Cardiovascular: dyspnea on exertion, but no chest pain or pressure and no swelling in the arms or legs  Respiratory: no shortness of breath and no pleurisy    The patient presents with complaints of mostly nighttime episodes of unusual or persistent cough, described as dry (+ occasional clear sputum)  Gastrointestinal: constipation, but no abdominal pain, no nausea, no vomiting, no heartburn, no diarrhea, no melena and no BRBPR  Genitourinary: no foamy urine, but no dysuria and no hematuria     Musculoskeletal: as noted in HPI  Integumentary no rash, no Raynaud's, no alopecia, no nail changes and no photosensitivity  Endocrine no polyuria and no polydipsia  Hematologic/Lymphatic: no unusual bleeding and no tendency for easy bruising  Neurological: tingling, but no headache, no vertigo or dizziness and no weakness  Psychiatric: non-restorative sleep, but no insomnia  Active Problems    1  Benign essential hypertension (401 1) (I10)   2  Cardiomyopathy (425 4) (I42 9)   3  Decreased libido (799 81) (R68 82)   4  Diabetes mellitus (250 00) (E11 9)   5  Encounter for monitoring rituximab therapy (V58 83,V58 69) (Z51 81,Z79 899)   6  Enlarged prostate without lower urinary tract symptoms (luts) (600 00) (N40 0)   7  Erectile dysfunction (607 84) (N52 9)   8  Hyperlipidemia (272 4) (E78 5)   9  ILD (interstitial lung disease) (515) (J84 9)   10  Lateral epicondylitis of right elbow (726 32) (M77 11)   11  Long term (current) use of systemic steroids (V58 65) (Z79 52)   12  Mild mitral regurgitation (424 0) (I34 0)   13  NSIP (nonspecific interstitial pneumonitis) (516 8) (J84 89)   14  Sjogren's syndrome (710 2) (M35 00)   15  Vitamin D deficiency (268 9) (E55 9)    Past Medical History    1  History of Allergic Reaction (995 3)   2  History of Allergic rhinitis (477 9) (J30 9)   3  History of Chest pain (786 50) (R07 9)   4  History of Chronic steroid use (V58 65)   5  History of Constipation (564 00) (K59 00)   6  History of Edema (782 3) (R60 9)   7  History of Esophageal reflux (530 81) (K21 9)   8  History of Herniated cervical disc (722 0) (M50 20)   9  History of hyperlipidemia (V12 29) (Z86 39)   10  History of hypertension (V12 59) (Z86 79)   11  History of shortness of breath (V13 89) (Z87 898)   12  History of Sjogren's disease (V13 59) (Z87 39)   13  History of Insomnia (780 52) (G47 00)   14  History of Myalgia (729 1) (M79 1)   15  History of Need for pneumococcal vaccination (V03 82) (Z23)   16  History of Oral thrush (112 0) (B37 0)   17  History of Orthostatic hypotension (458 0) (I95 1)   18  History of Pre-syncope (780 2) (R55)   19  History of Pruritus (698 9) (L29 9)   20  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)   21  History of Pulmonary disease (518 89) (J98 4)   22  History of Screening for colon cancer (V76 51) (Z12 11)   23  History of Special screening examination for neoplasm of prostate (V76 44) (Z12 5)   24  History of Spondylosis of cervical region without myelopathy or radiculopathy (721 0)    (M47 812)   25  History of Staggering gait (781 2) (R26 0)   26  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,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,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)   27  History of Testicular hypogonadism (257 2) (E29 1)   28  History of Transient Visual Loss (368 12)   29  History of Tremor (781 0) (R25 1)   30  History of Type 2 diabetes mellitus (250 00) (E11 9)   31  History of Type 2 diabetes mellitus with hyperglycemia (250 00) (E11 65)   32  History of Urgency of urination (788 63) (R39 15)   33  History of Weight loss (783 21) (R63 4)    The active problems and past medical history were reviewed and updated today  Surgical History    1  History of Oral Surgery Tooth Extraction   2  History of Tonsillectomy   3  History of Wedge Resection Of Lung    The surgical history was reviewed and updated today  Family History  Mother    1  Family history of Diabetes Mellitus (V18 0)   2  Family history of Hypertension (V17 49)  Father    3  Family history of Hypertension (V17 49)  Sister    4  Family history of arthritis (V17 7) (Z82 61)   5  Family history of malignant neoplasm of breast (V16 3) (Z80 3)  Family History    6  Family history of Family Health Status 2  Children Living   7  Family history of Family Health Status Of Brother - Alive   8  Family history of Family Health Status Of Father - Alive   5  Family history of Family Health Status Of Mother - Alive   8   Family history of Family Health Status Of Sister - Alive   6  Denied: Family history of Crohn's disease   12  Denied: Family history of psoriasis   13  Denied: Family history of rheumatoid arthritis   14  Denied: Family history of Sjogren's disease   13  Denied: Family history of systemic lupus erythematosus   16  Denied: Family history of ulcerative colitis    The family history was reviewed and updated today  Social History    · Being A Social Drinker   · Daily Coffee Consumption (1  Cups/Day)   · Denied: History of Drug Use   · Educational Level - Completed [address]   · Living Independently With Spouse   · Marital History - Currently    · Never A Smoker   · No drug use   · Occupation:   · Sexually Active   · Working Full Time  The social history was reviewed and updated today  The social history was reviewed and is unchanged  Current Meds   1  Advair Diskus 250-50 MCG/DOSE Inhalation Aerosol Powder Breath Activated; INHALE 1   PUFF TWICE DAILY  RINSE MOUTH AFTER USE; Therapy: 81SGT0108 to (Evaluate:2016)  Requested for: 0490 51 30 85; Last   Rx:12Zsk8602 Ordered   2  Atorvastatin Calcium 20 MG Oral Tablet; take 1 tablet every day; Therapy: 41TRD3101 to (Evaluate:2017)  Requested for: 58ALZ7848; Last   Rx:17Aow2085 Ordered   3  Hydroxychloroquine Sulfate 200 MG Oral Tablet; take 1 tablet twice daily after meals    Requested for: 16OFQ9307; Last 21CCZ9329 Ordered   4  Methocarbamol 750 MG Oral Tablet; TAKE 1 TABLET EVERY 6 HOURS AS NEEDED; Therapy: 82Qvk7394 to (Last Rx:44Pka6762)  Requested for: 83Hyh6388 Ordered   5  PredniSONE 2 5 MG Oral Tablet; Take 1 tablet daily; Therapy: (Recorded:89Qzf4058) to Recorded   6  Rituxan 10 MG/ML CONC; USE AS DIRECTED; Therapy: 80ETK3168 to Recorded   7  Tamsulosin HCl - 0 4 MG Oral Capsule; TAKE 1 CAPSULE Bedtime; Therapy: 69TKY1534 to (Evaluate:2017)  Requested for: 69ETW2455; Last   Rx:04Ban6493 Ordered   8   Viagra 100 MG Oral Tablet; TAKE 1 TABLET DAILY 1 HOUR BEFORE NEEDED; Therapy: 35TDC0468 to (Evaluate:2015); Last Rx:2015 Ordered    The medication list was reviewed and updated today  Immunizations  Influenza --- Blanca Heide: [id number] Michaels: 2014; Series3: [id number] Peters:  01-Sep-2012   PCV --- Series1: 16-Oct-2015   PPSV --- Series1: 01-Aug-2012     Allergies    1  CellCept TABS   2  Avelox TABS   3  Axiron SOLN   4  Imuran TABS   5  Zithromax PACK    Vitals  Signs   Recorded: 60NSM7750 00:46ET   Systolic: 472  Diastolic: 62  Heart Rate: 68  Weight: 155 lb   BMI Calculated: 19 9  BSA Calculated: 1 95    Physical Exam    Constitutional   General appearance: No acute distress, well appearing and well nourished  Eyes   Conjunctiva and lids: No swelling, erythema or discharge  Pupils and irises: Equal, round and reactive to light  Ears, Nose, Mouth, and Throat   External inspection of ears and nose: Normal     Oropharynx: Abnormal   Oral mucosa was dry  Pulmonary   Respiratory effort: No increased work of breathing or signs of respiratory distress  Auscultation of lungs: Clear to auscultation  Cardiovascular   Auscultation of heart: Normal rate and rhythm, normal S1 and S2, without murmurs  Examination of extremities for edema and/or varicosities: Normal     Lymphatic   Palpation of lymph nodes in neck: No lymphadenopathy  Psychiatric   Orientation to person, place, and time: Normal     Mood and affect: Normal         Right elbow tenderness  Left Elbow tenderness     Skin - Skin and subcutaneous tissue: Normal    Neurologic - Sensation: Normal       Results/Data  (Q) CBC (H/H, RBC, INDICES, WBC, PLT) 76WQA3388 08:21AM [facility]     Test Name Result Flag Reference   WHITE BLOOD CELL COUNT 5 3 Thousand/uL  3 8-10 8   RED BLOOD CELL COUNT 5 16 Million/uL  4 20-5 80   HEMOGLOBIN 14 9 g/dL  13 2-17 1   HEMATOCRIT 46 2 %  38 5-50 0   MCV 89 6 fL  80 0-100 0   MCH 28 9 pg  27 0-33 0   MCHC 32 3 g/dL  32 0-36 0   RDW 13 5 % 11 0-15 0   PLATELET COUNT 067 Thousand/uL  140-400   MPV 8 0 fL  7 5-11 5     *(Q) VITAMIN D, 25-HYDROXY, LC/MS/MS 54RRC6879 08:21AM [facility]     Test Name Result Flag Reference   VITAMIN D, 25-OH, TOTAL 40 ng/mL     Vitamin D Status         25-OH Vitamin D:     Deficiency:                    <20 ng/mL  Insufficiency:             20 - 29 ng/mL  Optimal:                 > or = 30 ng/mL     For 25-OH Vitamin D testing on patients on   D2-supplementation and patients for whom quantitation   of D2 and D3 fractions is required, the QuestAssureD(TM)  25-OH VIT D, (D2,D3), LC/MS/MS is recommended: order   code 84747 (patients >2yrs)  For more information on this test, go to:  http://LogicTree/faq/USX530  (This link is being provided for   informational/educational purposes only )     (Q) TSH, 3RD GENERATION W/REFLEX TO FT4 68WBI1207 08:21AM [facility]     Test Name Result Flag Reference   TSH W/REFLEX TO FT4 1 99 mIU/L  0 40-4 50     (1) COMPREHENSIVE METABOLIC PANEL 43QSB6243 73:26ZJ [facility]   REPORT COMMENT:  FASTING:YES     Test Name Result Flag Reference   GLUCOSE 77 mg/dL  65-99   Fasting reference interval   UREA NITROGEN (BUN) 22 mg/dL  7-25   CREATININE 0 97 mg/dL  0 70-1 33   For patients >52years of age, the reference limit  for Creatinine is approximately 13% higher for people  identified as -American  eGFR NON-AFR   AMERICAN 91 mL/min/1 73m2  > OR = 60   eGFR AFRICAN AMERICAN 105 mL/min/1 73m2  > OR = 60   BUN/CREATININE RATIO   1-82   NOT APPLICABLE (calc)   SODIUM 138 mmol/L  135-146   POTASSIUM 4 5 mmol/L  3 5-5 3   CHLORIDE 101 mmol/L     CARBON DIOXIDE 26 mmol/L  19-30   CALCIUM 9 5 mg/dL  8 6-10 3   PROTEIN, TOTAL 6 3 g/dL  6 1-8 1   ALBUMIN 4 4 g/dL  3 6-5 1   GLOBULIN 1 9 g/dL (calc)  1 9-3 7   ALBUMIN/GLOBULIN RATIO 2 3 (calc)  1 0-2 5   BILIRUBIN, TOTAL 0 7 mg/dL  0 2-1 2   ALKALINE PHOSPHATASE 51 U/L     AST 16 U/L  10-35   ALT 13 U/L  9-46       Health Management  Diabetes mellitus   *VB - Eye Exam; every 1 year; Last 35TFS1826; Next Due: 47DLA6020; Overdue  *VB-Foot Exam; every 1 year; Next Due: 10GBO7726;  Overdue    Future Appointments    Date/Time Provider Specialty Site   12/13/2016 09:00 AM Ray DearDO Family Medicine [facility]   10/13/2016 09:40 AM Rosalba Aguero DO Rheumatology [facility]   06/12/2017 03:00 PM José Mejia  Urology [address]   Electronically signed by : Addison Padgett DO; Aug  5 2016  1:47PM EST                       (Author)

## 2018-01-13 VITALS
SYSTOLIC BLOOD PRESSURE: 100 MMHG | WEIGHT: 158.25 LBS | BODY MASS INDEX: 20.31 KG/M2 | HEART RATE: 73 BPM | DIASTOLIC BLOOD PRESSURE: 66 MMHG | HEIGHT: 74 IN | RESPIRATION RATE: 16 BRPM | TEMPERATURE: 97 F | OXYGEN SATURATION: 98 %

## 2018-01-13 VITALS
DIASTOLIC BLOOD PRESSURE: 86 MMHG | OXYGEN SATURATION: 99 % | SYSTOLIC BLOOD PRESSURE: 118 MMHG | HEIGHT: 75 IN | BODY MASS INDEX: 20.31 KG/M2 | TEMPERATURE: 96.9 F | RESPIRATION RATE: 18 BRPM | WEIGHT: 163.38 LBS | HEART RATE: 89 BPM

## 2018-01-13 NOTE — CONSULTS
I had the pleasure of evaluating your patient, Horacio Pack  My full evaluation follows:      History of Present Illness  The patient is here today for followup regarding NSIP  His pulmonary status has been fairly stable  He is now getting Rituxan every 4 months per rheumatology  He feels that it has helped his joints  He continues to have some shortness of breath when he is hired and talks a lot  He denies significant cough, wheeze, sputum production or hemoptysis  He is using Advair twice a day  Review of Systems    Constitutional: No fever or chills, feels well, no tiredness, no recent weight gain or weight loss  Cardiovascular: no palpitations, no chest pain  Respiratory: as noted in HPI  Gastrointestinal: no complaints of esophageal reflux, no abdominal pain  Active Problems    1  Benign essential hypertension (401 1) (I10)   2  BPH (benign prostatic hyperplasia) (600 00) (N40 0)   3  Cardiomyopathy (425 4) (I42 9)   4  Decreased libido (799 81) (R68 82)   5  Diabetes mellitus (250 00) (E11 9)   6  Dysuria (788 1) (R30 0)   7  Encounter for monitoring rituximab therapy (V58 83,V58 69) (Z51 81,Z79 899)   8  Hyperlipidemia (272 4) (E78 5)   9  ILD (interstitial lung disease) (515) (J84 9)   10  Long term (current) use of systemic steroids (V58 65) (Z79 52)   11  Mitral regurgitation (424 0) (I34 0)   12  History of Need for prophylactic vaccination and inoculation against influenza (V04 81)    (Z23)   13  NSIP (nonspecific interstitial pneumonitis) (516 8) (J84 89)   14  Sjogren's syndrome (710 2) (M35 00)   15   Vitamin D deficiency (268 9) (E55 9)    Past Medical History    · History of Allergic Reaction (995 3)   · History of Allergic rhinitis (477 9) (J30 9)   · History of Chest pain (786 50) (R07 9)   · History of Chronic steroid use (V58 65)   · History of Constipation (564 00) (K59 00)   · History of Edema (782 3) (R60 9)   · History of Esophageal reflux (530 81) (K21 9)   · History of Herniated cervical disc (722 0) (M50 20)   · History of hyperlipidemia (V12 29) (Z86 39)   · History of hypertension (V12 59) (Z86 79)   · History of shortness of breath (V13 89) (C64 158)   · History of Sjogren's disease (V13 59) (Z87 39)   · History of Insomnia (780 52) (G47 00)   · History of Myalgia (729 1) (M79 1)   · History of Need for pneumococcal vaccination (V03 82) (Z23)   · History of Need for prophylactic vaccination and inoculation against influenza (V04 81)  (Z23)   · History of Oral thrush (112 0) (B37 0)   · History of Orthostatic hypotension (458 0) (I95 1)   · History of Pre-syncope (780 2) (R55)   · History of Pruritus (698 9) (L29 9)   · 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)   · History of Pulmonary disease (518 89) (J98 4)   · History of Screening for colon cancer (V76 51) (Z12 11)   · History of Special screening examination for neoplasm of prostate (V76 44) (Z12 5)   · History of Spondylosis of cervical region without myelopathy or radiculopathy (721 0)  (M47 812)   · History of Staggering gait (781 2) (R26 0)   · 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,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,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)   · History of Testicular hypogonadism (257 2) (E29 1)   · History of Transient Visual Loss (368 12)   · History of Tremor (781 0) (R25 1)   · History of Type 2 diabetes mellitus (250 00) (E11 9)   · History of Type 2 diabetes mellitus with hyperglycemia (250 00) (E11 65)   · History of Urgency of urination (788 63) (R39 15)   · History of Weight loss (783 21) (R63 4)    Surgical History    · History of Oral Surgery Tooth Extraction   · History of Tonsillectomy   · History of Wedge Resection Of Lung    The surgical history was reviewed and updated today         Family History    · Family history of Diabetes Mellitus (V18 0)   · Family history of Hypertension (V17 49)    · Family history of Hypertension (V17 49)    · Family history of Family Health Status 2  Children Living   · Family history of Family Health Status Of Brother - Alive   · Family history of Family Health Status Of Father - Alive   · Family history of Family Health Status Of Mother - Alive   · Family history of Family Health Status Of Sister - Alive   · Denied: Family history of Crohn's disease   · Denied: Family history of psoriasis   · Denied: Family history of rheumatoid arthritis   · Denied: Family history of Sjogren's disease   · Denied: Family history of systemic lupus erythematosus   · Denied: Family history of ulcerative colitis    The family history was reviewed and updated today  Social History    · Being A Social Drinker   · Daily Coffee Consumption (1  Cups/Day)   · Denied: History of Drug Use   · Educational Level - Completed [address]nd [address] ([employer])   · Living Independently With Spouse   · Marital History - Currently    · Never A Smoker   · No drug use   · Occupation:   · Sexually Active   · Working Full Time  The social history was reviewed and is unchanged  Current Meds   1  Advair Diskus 250-50 MCG/DOSE Inhalation Aerosol Powder Breath Activated; INHALE 1   PUFF TWICE DAILY  RINSE MOUTH AFTER USE; Therapy: 92KCC7455 to (Evaluate:14Oct2016)  Requested for: 0490 51 30 85; Last   Rx:49Lte4638 Ordered   2  Atorvastatin Calcium 20 MG Oral Tablet; take 1 tablet every day; Therapy: 90ZOD5784 to (Evaluate:12Jun2016)  Requested for: 65ANR8660; Last   Rx:33Lwj4621 Ordered   3  Hydroxychloroquine Sulfate 200 MG Oral Tablet; take 1 tablet twice daily after meals    Requested for: 15Vuu3005; Last Rx:85Xev3550 Ordered   4  Methocarbamol 750 MG Oral Tablet; TAKE 1 TABLET EVERY 6 HOURS AS NEEDED; Therapy: 03Fmo1066 to (Last Rx:97Glr9369)  Requested for: 26Xxd1152 Ordered   5  PredniSONE 5 MG Oral Tablet; take 0 5 tablet daily; Therapy: (Hillary Negron) to Recorded   6  Rituxan 10 MG/ML CONC; USE AS DIRECTED; Therapy: 52SDC0192 to Recorded   7  Tamsulosin HCl - 0 4 MG Oral Capsule; TAKE 1 CAPSULE Bedtime;    Therapy: 27Apr2015 to (Evaluate:21Apr2016)  Requested for: 27Apr2015; Last   Rx:27Apr2015 Ordered   8  Terbinafine HCl - 250 MG Oral Tablet; TAKE 1 TABLET DAILY; Therapy: (Recorded:56Ulr2804) to Recorded   9  Viagra 100 MG Oral Tablet; TAKE 1 TABLET DAILY 1 HOUR BEFORE NEEDED; Therapy: 21XYD0440 to (Evaluate:09Mar2015); Last Rx:03Mar2015 Ordered    The medication list was reviewed and updated today  Allergies    1  CellCept TABS   2  Avelox TABS   3  Axiron SOLN   4  Imuran TABS   5  Zithromax PACK    Vitals   Recorded: 75BDL8851 09:46AM   Temperature 97 9 F   Heart Rate 85   Respiration 14   Systolic 904   Diastolic 70   Height 6 ft 2 in   Weight 156 lb    BMI Calculated 20 03   BSA Calculated 1 96   O2 Saturation 100, RA     Physical Exam    Constitutional   General appearance: No acute distress, well appearing and well nourished  Pulmonary   Respiratory effort: No increased work of breathing or signs of respiratory distress  Auscultation of lungs: Clear to auscultation, no rales, no crackles, no wheezing  Cardiovascular   Auscultation of heart: Normal rate and rhythm, normal S1 and S2, no murmurs  Abdomen   Abdomen: Soft, non-tender  Lymphatic   Palpation of lymph nodes in neck: No lymphadenopathy  Neurologic   Mental Status: Normal  Not confused, no evidence of dementia, good comprehension, good concentration  Skin   Skin and subcutaneous tissue: Limited exam shows no rash  Psychiatric   Orientation to person, place and time: Normal     Mood and affect: Normal        Assessment    1  NSIP (nonspecific interstitial pneumonitis) (516 8) (J84 89)   · Nonspecific interstitial pneumonia, status post biopsy August 22, 2012    Plan  NSIP (nonspecific interstitial pneumonitis)    · * CT CHEST HIGH RESOLUTION; Status:Need Information - Financial Authorization; Requested for:27Zjb2205 02:30PM;    Perform:[facility];  Order Comments:[facility]; NIQ:[id number]; Last Updated By:Chery Ureña; 1/26/2016 10:19:34 AM;Ordered;  For:NSIP (nonspecific interstitial pneumonitis); Ordered By:Amor Pompa;   · Complete PFT; Status:Active; Requested for:90Nqg9652 03:00PM;    Perform:[facility]; Order Comments:[facility]; SANDRA:[id number]; Last Updated By:Chery Ureña; 1/26/2016 10:19:35 AM;Ordered;  For:NSIP (nonspecific interstitial pneumonitis); Ordered By:Amor Pompa;    Discussion/Summary    The patient is doing quite well from a pulmonary standpoint  We will update his pulmonary function testing and high-resolution chest CT in March  I instructed him to decrease his prednisone to 2 5 mg daily  If he tolerates this change, he may decrease Advair to once daily dosing  He will continue with Rituxan per rheumatology  I will call him with his test results and we will determine appropriate followup thereafter  The treatment plan was reviewed with the patient/guardian  The patient/guardian understands and agrees with the treatment plan      Health Management   *VB - Eye Exam; every 1 year; Last 70VEQ8496; Next Due: 30DLN6277; Overdue  *VB-Foot Exam; every 1 year; Next Due: 39ARL1471; Overdue    Thank you very much for allowing me to participate in the care of this patient  If you have any questions, please do not hesitate to contact me        Future Appointments    Signatures   Electronically signed by : Gilberto Ramirez DO; Jan 26 2016 10:49AM EST                       (Author)

## 2018-01-14 VITALS
SYSTOLIC BLOOD PRESSURE: 126 MMHG | HEART RATE: 84 BPM | DIASTOLIC BLOOD PRESSURE: 78 MMHG | BODY MASS INDEX: 20.83 KG/M2 | WEIGHT: 162.25 LBS

## 2018-01-14 VITALS
HEART RATE: 84 BPM | BODY MASS INDEX: 20.14 KG/M2 | HEIGHT: 75 IN | SYSTOLIC BLOOD PRESSURE: 110 MMHG | DIASTOLIC BLOOD PRESSURE: 76 MMHG | WEIGHT: 162 LBS

## 2018-01-14 VITALS
SYSTOLIC BLOOD PRESSURE: 110 MMHG | HEART RATE: 95 BPM | DIASTOLIC BLOOD PRESSURE: 80 MMHG | RESPIRATION RATE: 20 BRPM | HEIGHT: 75 IN | OXYGEN SATURATION: 97 % | BODY MASS INDEX: 19.95 KG/M2 | TEMPERATURE: 94.9 F | WEIGHT: 160.44 LBS

## 2018-01-14 VITALS
BODY MASS INDEX: 20.16 KG/M2 | HEART RATE: 78 BPM | OXYGEN SATURATION: 98 % | WEIGHT: 162.13 LBS | RESPIRATION RATE: 14 BRPM | SYSTOLIC BLOOD PRESSURE: 108 MMHG | DIASTOLIC BLOOD PRESSURE: 78 MMHG | HEIGHT: 75 IN | TEMPERATURE: 96.3 F

## 2018-01-15 VITALS
OXYGEN SATURATION: 100 % | SYSTOLIC BLOOD PRESSURE: 122 MMHG | TEMPERATURE: 98.7 F | HEIGHT: 74 IN | RESPIRATION RATE: 14 BRPM | BODY MASS INDEX: 20.98 KG/M2 | HEART RATE: 90 BPM | WEIGHT: 163.44 LBS | DIASTOLIC BLOOD PRESSURE: 78 MMHG

## 2018-01-15 NOTE — RESULT NOTES
Verified Results  * CT CHEST HIGH RESOLUTION [id number] 02:36PM Corrina [place] Order Number: [id number]   Performing Comments: [facility] [place]   - Patient Instructions: To schedule this appointment, please contact Central Scheduling at [phone]  Test Name Result Flag Reference   CT CHEST HIGH RESOLUTION (Report)     CT CHEST INCLUDING HIGH RESOLUTION SLICES - WITHOUT CONTRAST     INDICATION: History of NSIP by lung biopsy  Follow-up exam      COMPARISON: 9/9/2014  TECHNIQUE: CT examination of the chest was performed without intravenous contrast  Additional thin section images were performed at 10 mm intervals in supine and prone positioning in inspiration as well as supine in expiration  Examination was    performed utilizing techniques to minimize radiation dose, including the use of dose reduction software  Axial and coronal reformatted images were created  FINDINGS:     LUNGS: Stable reticular subpleural changes are again seen predominantly within the lower lobes  Right lower lobe post biopsy changes again present  Stable 3 mm linear right middle lobe nodule is noted series 4 image 44  PLEURA: Unremarkable  VESSELS: Unremarkable for patient's age  HEART: Unremarkable  MEDIASTINUM AND KEVIN: Unremarkable  CHEST WALL AND LOWER NECK: Unremarkable  VISUALIZED STRUCTURES IN THE UPPER ABDOMEN: Unremarkable  OSSEOUS STRUCTURES: No acute fracture or destructive osseous lesion  IMPRESSION:     Stable subpleural reticular changes predominantly within the lower lobes  Findings are again consistent with lung biopsy history of NSIP  Stable right middle lobe pulmonary nodule  Workstation performed: [id number]     Signed by:    Adalid Menjivar MD   2/10/16

## 2018-01-15 NOTE — PROGRESS NOTES
Assessment    1  Sjogren's syndrome (710 2) (M35 00)   2  ILD (interstitial lung disease) (515) (J84 9)   3  Vitamin D deficiency (268 9) (E55 9)   4  Encounter for monitoring rituximab therapy (V58 83,V58 69) (Z51 81,Z79 899)   5  Long term (current) use of systemic steroids (V58 65) (Z79 52)   6  Cardiomyopathy (425 4) (I42 9)   7  Diabetes mellitus (250 00) (E11 9)   8  Benign essential hypertension (401 1) (I10)    Plan    1  Follow-up visit in 2 months Evaluation and Treatment  Follow-up  Status: Complete    Done: 53MQN7629    2  Hydroxychloroquine Sulfate 200 MG Oral Tablet; take 1 tablet twice daily after   meals   3  Call [phone] if: The pain seems worse ; Status:Complete;   Done: 67UAK3105   4  Call [phone] if: You have questions or concerns about your problem ;   Status:Complete;   Done: 36JRR4897    5  PredniSONE 2 5 MG Oral Tablet; Take 1 tablet daily    Discussion/Summary    Mr Den Bolivar has been feeling okay since his last evaluation  He does complain of pain in his bilateral elbows and hips  He will occasionally take Tylenol as needed for his discomfort with some relief  He reports of the pain is intermittent  He does report a significant amount of difficulty getting moving in the morning because of fatigue, and this begins to improve after one to 2 hours  He does report that his fatigue seems to cause him to have a very poor mood especially at night  He also complains of some occasional ankle pain  He denies any other significant joint pain or any obvious joint swelling  He denies any morning stiffness, but he does report jelling when sitting for prolonged periods of time  He denies any difficulty sleeping because of the pain  He does report nonrestorative sleep  He states that his last Rituxan infusion was over 7 months ago, but he is scheduled coming up later this month   He also reports that he is scheduled for an updated eye exam later this month, as he has not had one in over a year  On exam, there is mild synovitis of the PIPs of the hands, as well as the bilateral elbows  There is no other active synovitis  Review of laboratory studies revealed a normal CBC, CMP, TSH, and vitamin D  At this time, his Sjogren's and ILD do appear mildly active and he is overdue for his Rituxan infusions  We will plan to readminister the dose later this month, and continue him on an every 4 month administration cycle  His disease was much more well controlled when he was dose like this in the past  I will reevaluate him in 2 months  If he does not have a significant improvement in his joint pain at that visit, we may consider adding an oral DMARD to his medication regimen  I will not make any other changes to his medications at this time  He will call in the interim if there are any questions or concerns  Counseling  Rheumatology Counseling Documentation: The patient was counseled regarding diagnostic results, instructions for management and impressions  Chief Complaint  F/U Sjogren's and ILD   Patient is here today for follow up of chronic conditions described in HPI  History of Present Illness  Pt  returns for F/U for Sjogren's and ILD  Feeling OK since last visit  c/o significant pain in elbows and hips  Takes Tylenol on occasion with some relief  Pain is intermittent  + difficulty getting moving in AM due to fatigue for 1 to 2 hours  Fatigue causes a poor mood  + occasional ankle pain as well  No other significant joint pain  No obvious joint swelling  No AM stiffness  + gelling when sitting for prolonged periods of time  No difficulty sleeping due to pain  + non-restorative sleep  Last eye exam over 1 year ago  Scheduled for eye exam in near future  Last Rituxan over 7 months ago  RAPID3: 9 7/30      Review of Systems    Constitutional: fatigue, but no fever, no recent weight gain, no chills, no recent weight loss and no anorexia     HEENT: feeling congested, but no blurred vision, no double vision, no amaurosis fugax, no dryness of the eyes, no erythema eye(s), no dryness mouth, no mouth sores and no sore throat    The patient presents with complaints of bilateral eye pain, described as aching  Cardiovascular: dyspnea on exertion, but no chest pain or pressure and no swelling in the arms or legs  Respiratory: no unusual or persistent cough, no shortness of breath and no pleurisy  Gastrointestinal: constipation, but no abdominal pain, no nausea, no vomiting, no heartburn, no diarrhea, no melena and no BRBPR  Genitourinary: no foamy urine, but no dysuria and no hematuria  Musculoskeletal: as noted in HPI  Integumentary no rash, no Raynaud's, no alopecia, no nail changes and no photosensitivity  Endocrine no polyuria and no polydipsia  Hematologic/Lymphatic: no unusual bleeding and no tendency for easy bruising  Neurological: tingling, but no headache, no vertigo or dizziness and no weakness  Psychiatric: non-restorative sleep, but no insomnia  Active Problems    1  Benign essential hypertension (401 1) (I10)   2  BPH (benign prostatic hyperplasia) (600 00) (N40 0)   3  Cardiomyopathy (425 4) (I42 9)   4  Decreased libido (799 81) (R68 82)   5  Diabetes mellitus (250 00) (E11 9)   6  Encounter for monitoring rituximab therapy (V58 83,V58 69) (Z51 81,Z79 899)   7  Hyperlipidemia (272 4) (E78 5)   8  ILD (interstitial lung disease) (515) (J84 9)   9  Lateral epicondylitis of right elbow (726 32) (M77 11)   10  Long term (current) use of systemic steroids (V58 65) (Z79 52)   11  Mitral regurgitation (424 0) (I34 0)   12  NSIP (nonspecific interstitial pneumonitis) (516 8) (J84 89)   13  Sjogren's syndrome (710 2) (M35 00)   14  Special screening examination for neoplasm of prostate (V76 44) (Z12 5)   15  Vitamin D deficiency (268 9) (E55 9)    Past Medical History    1  History of Allergic Reaction (995 3)   2  History of Allergic rhinitis (477 9) (J30 9)   3   History of Chest pain (786 50) (R07 9)   4  History of Chronic steroid use (V58 65)   5  History of Constipation (564 00) (K59 00)   6  History of Edema (782 3) (R60 9)   7  History of Esophageal reflux (530 81) (K21 9)   8  History of Herniated cervical disc (722 0) (M50 20)   9  History of hyperlipidemia (V12 29) (Z86 39)   10  History of hypertension (V12 59) (Z86 79)   11  History of shortness of breath (V13 89) (Z87 898)   12  History of Sjogren's disease (V13 59) (Z87 39)   13  History of Insomnia (780 52) (G47 00)   14  History of Myalgia (729 1) (M79 1)   15  History of Need for pneumococcal vaccination (V03 82) (Z23)   16  History of Oral thrush (112 0) (B37 0)   17  History of Orthostatic hypotension (458 0) (I95 1)   18  History of Pre-syncope (780 2) (R55)   19  History of Pruritus (698 9) (L29 9)   20  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)   21  History of Pulmonary disease (518 89) (J98 4)   22  History of Screening for colon cancer (V76 51) (Z12 11)   23  History of Spondylosis of cervical region without myelopathy or radiculopathy (721 0)    (M47 812)   24  History of Staggering gait (781 2) (R26 0)   25  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,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,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)   26  History of Testicular hypogonadism (257 2) (E29 1)   27  History of Transient Visual Loss (368 12)   28  History of Tremor (781 0) (R25 1)   29  History of Type 2 diabetes mellitus (250 00) (E11 9)   30  History of Type 2 diabetes mellitus with hyperglycemia (250 00) (E11 65)   31  History of Urgency of urination (788 63) (R39 15)   32  History of Weight loss (783 21) (R63 4)    The active problems and past medical history were reviewed and updated today  Surgical History    1  History of Oral Surgery Tooth Extraction   2  History of Tonsillectomy   3  History of Wedge Resection Of Lung    The surgical history was reviewed and updated today  Family History  Mother    1   Family history of Diabetes Mellitus (V18 0) 2  Family history of Hypertension (V17 49)  Father    3  Family history of Hypertension (V17 49)  Sister    4  Family history of arthritis (V17 7) (Z82 61)  Family History    5  Family history of Family Health Status 2  Children Living   6  Family history of Family Health Status Of Brother - Alive   7  Family history of Family Health Status Of Father - Alive   8  Family history of Family Health Status Of Mother - Alive   5  Family history of Family Health Status Of Sister - Alive   8  Denied: Family history of Crohn's disease   6  Denied: Family history of psoriasis   12  Denied: Family history of rheumatoid arthritis   13  Denied: Family history of Sjogren's disease   15  Denied: Family history of systemic lupus erythematosus   15  Denied: Family history of ulcerative colitis    The family history was reviewed and updated today  Social History    · Being A Social Drinker   · Daily Coffee Consumption (1  Cups/Day)   · Denied: History of Drug Use   · Educational Level - Completed [employer]   · Living Independently With Spouse   · Marital History - Currently    · Never A Smoker   · No drug use   · Occupation:   · Sexually Active   · Working Full Time  The social history was reviewed and updated today  The social history was reviewed and is unchanged  Current Meds   1  Advair Diskus 250-50 MCG/DOSE Inhalation Aerosol Powder Breath Activated; INHALE 1   PUFF TWICE DAILY  RINSE MOUTH AFTER USE; Therapy: 29SCD3561 to (Evaluate:14Oct2016)  Requested for: 0490 51 30 85; Last   Rx:14Rwo4931 Ordered   2  Atorvastatin Calcium 20 MG Oral Tablet; take 1 tablet every day; Therapy: 37TXH6756 to (Evaluate:12Jun2016)  Requested for: 74PBE0610; Last   Rx:01Txb4987 Ordered   3  Hydroxychloroquine Sulfate 200 MG Oral Tablet; take 1 tablet twice daily after meals    Requested for: 38Zct6023; Last Rx:93Dbw0226 Ordered   4  Methocarbamol 750 MG Oral Tablet; TAKE 1 TABLET EVERY 6 HOURS AS NEEDED;    Therapy: 18GNP9108 to (Last Rx:20Epr5754)  Requested for: 42Iqu6825 Ordered   5  PredniSONE 2 5 MG Oral Tablet; Take 1 tablet daily; Therapy: (Recorded:07Jun2016) to Recorded   6  Rituxan 10 MG/ML CONC; USE AS DIRECTED; Therapy: 30YUA3612 to Recorded   7  Tamsulosin HCl - 0 4 MG Oral Capsule; TAKE 1 CAPSULE Bedtime; Therapy: 94BVG6093 to (Evaluate:28Jan2017)  Requested for: 89TRF8144; Last   Rx:20Bax9987 Ordered   8  Terbinafine HCl - 250 MG Oral Tablet; TAKE 1 TABLET DAILY; Therapy: (Recorded:30Sep2015) to Recorded   9  Viagra 100 MG Oral Tablet; TAKE 1 TABLET DAILY 1 HOUR BEFORE NEEDED; Therapy: 55SPC9141 to (Evaluate:09Mar2015); Last Rx:03Mar2015 Ordered    The medication list was reviewed and updated today  Immunizations  Influenza --- Spa Kathy: 97PTS1532Pvcdkdpf Berger: Nov 2014; Series3: Sep-2012   Pneumococcal --- Jesus Chavez: 15LKB7900; Ilsa Johnson: Aug-2012     Allergies    1  CellCept TABS   2  Avelox TABS   3  Axiron SOLN   4  Imuran TABS   5  Zithromax PACK    Vitals  Signs [Data Includes: Current Encounter]   Recorded: [id number] 03:32PM   Heart Rate: 80  Systolic: 125  Diastolic: 72  Weight: 548 lb   BMI Calculated: 19 64  BSA Calculated: 1 94    Physical Exam    Constitutional   General appearance: No acute distress, well appearing and well nourished  Eyes   Conjunctiva and lids: No swelling, erythema or discharge  Pupils and irises: Equal, round and reactive to light  Ears, Nose, Mouth, and Throat   External inspection of ears and nose: Normal     Oropharynx: Abnormal   Oral mucosa was dry  Pulmonary   Respiratory effort: No increased work of breathing or signs of respiratory distress  Auscultation of lungs: Clear to auscultation  Cardiovascular   Auscultation of heart: Normal rate and rhythm, normal S1 and S2, without murmurs  Examination of extremities for edema and/or varicosities: Normal     Lymphatic   Palpation of lymph nodes in neck: No lymphadenopathy      Psychiatric   Orientation to person, place, and time: Normal     Mood and affect: Normal         Right elbow swelling  Left Elbow swelling  Right Upper Extremity: Right Hand: Right Wrist: Right Elbow: Tenderness: lateral epicondyle  Right Shoulder:   Skin - Skin and subcutaneous tissue: Normal    Neurologic - Sensation: Normal      The patient has swelling of all PIP joints of the right hand  The patient has swelling of all PIP joints of the left hand  Results/Data  Results   (Q) CBC (H/H, RBC, INDICES, WBC, PLT) [id number] 08:21AM [facility]     Test Name Result Flag Reference   WHITE BLOOD CELL COUNT 5 3 Thousand/uL  3 8-10 8   RED BLOOD CELL COUNT 5 16 Million/uL  4 20-5 80   HEMOGLOBIN 14 9 g/dL  13 2-17 1   HEMATOCRIT 46 2 %  38 5-50 0   MCV 89 6 fL  80 0-100 0   MCH 28 9 pg  27 0-33 0   MCHC 32 3 g/dL  32 0-36 0   RDW 13 5 %  11 0-15 0   PLATELET COUNT 849 Thousand/uL  140-400   MPV 8 0 fL  7 5-11 5     *(Q) VITAMIN D, 25-HYDROXY, LC/MS/MS [id number] 08:21AM [facility]     Test Name Result Flag Reference   VITAMIN D, 25-OH, TOTAL 40 ng/mL     Vitamin D Status         25-OH Vitamin D:     Deficiency:                    <20 ng/mL  Insufficiency:             20 - 29 ng/mL  Optimal:                 > or = 30 ng/mL     For 25-OH Vitamin D testing on patients on   D2-supplementation and patients for whom quantitation   of D2 and D3 fractions is required, the QuestAssureD(TM)  25-OH VIT D, (D2,D3), LC/MS/MS is recommended: order   code 62171 (patients >2yrs)  For more information on this test, go to:  http://education  InfraReDx/faq/CKS549  (This link is being provided for   informational/educational purposes only )     (Q) TSH, 3RD GENERATION W/REFLEX TO FT4 41GHU4094 08:21AM [facility]     Test Name Result Flag Reference   TSH W/REFLEX TO FT4 1 99 mIU/L  0 40-4 50     (1) COMPREHENSIVE METABOLIC PANEL 06YHH9503 12:90FJ [place] [facility]   REPORT COMMENT:  FASTING:YES     Test Name Result Flag Reference GLUCOSE 77 mg/dL  65-99   Fasting reference interval   UREA NITROGEN (BUN) 22 mg/dL  7-25   CREATININE 0 97 mg/dL  0 70-1 33   For patients >52years of age, the reference limit  for Creatinine is approximately 13% higher for people  identified as -American  eGFR NON-AFR  AMERICAN 91 mL/min/1 73m2  > OR = 60   eGFR AFRICAN AMERICAN 105 mL/min/1 73m2  > OR = 60   BUN/CREATININE RATIO   9-51   NOT APPLICABLE (calc)   SODIUM 138 mmol/L  135-146   POTASSIUM 4 5 mmol/L  3 5-5 3   CHLORIDE 101 mmol/L     CARBON DIOXIDE 26 mmol/L  19-30   CALCIUM 9 5 mg/dL  8 6-10 3   PROTEIN, TOTAL 6 3 g/dL  6 1-8 1   ALBUMIN 4 4 g/dL  3 6-5 1   GLOBULIN 1 9 g/dL (calc)  1 9-3 7   ALBUMIN/GLOBULIN RATIO 2 3 (calc)  1 0-2 5   BILIRUBIN, TOTAL 0 7 mg/dL  0 2-1 2   ALKALINE PHOSPHATASE 51 U/L     AST 16 U/L  10-35   ALT 13 U/L  9-46       Health Management  Diabetes mellitus   *VB - Eye Exam; every 1 year; Last 61CYF9763; Next Due: 65SAJ3159; Overdue  *VB-Foot Exam; every 1 year; Next Due: 88LZZ1380;  Overdue    Future Appointments    Date/Time Provider Specialty Site   06/10/2016 09:15 AM Jose De Jesus Lentz DO Family Medicine [facility]   06/14/2016 09:20 AM Shahriar Baird MD Cardiology [facility] [address] [facility]   08/05/2016 10:00 AM Patricia Davis DO Rheumatology [facility]   06/13/2016 09:30 AM Guadelupe Saint, [address] Urology [address]   Electronically signed by : Johnie Ryan DO; Jun 7 2016  5:15PM EST                       (Author)

## 2018-01-15 NOTE — PROGRESS NOTES
Assessment    1  NSIP (nonspecific interstitial pneumonitis) (516 8) (J84 89)    Plan  NSIP (nonspecific interstitial pneumonitis)    · * CT CHEST HIGH RESOLUTION; Status:Need Information - Financial Authorization; Requested for:[id number] 02:30PM;    Perform:[facility]; Order Comments:[address]; FVI:[id number]; Last Updated By:Alexx Ureña; 1/26/2016 10:19:34 AM;Ordered;  For:NSIP (nonspecific interstitial pneumonitis); Ordered By:Steph Pompa;   · Complete PFT; Status:Active; Requested for:54Kvl8792 03:00PM;    Perform:[facility]; Order Comments:[address]; COK:[id number]; Last Updated By:Alexx Ureña; 1/26/2016 10:19:35 AM;Ordered;  For:NSIP (nonspecific interstitial pneumonitis); Ordered By:Steph Pompa;    Discussion/Summary  Discussion Summary:   The patient is doing quite well from a pulmonary standpoint  We will update his pulmonary function testing and high-resolution chest CT in March  I instructed him to decrease his prednisone to 2 5 mg daily  If he tolerates this change, he may decrease Advair to once daily dosing  He will continue with Rituxan per rheumatology  I will call him with his test results and we will determine appropriate followup thereafter  Understands and agrees with treatment plan: The treatment plan was reviewed with the patient/guardian  The patient/guardian understands and agrees with the treatment plan      Active Problems    1  Benign essential hypertension (401 1) (I10)   2  BPH (benign prostatic hyperplasia) (600 00) (N40 0)   3  Cardiomyopathy (425 4) (I42 9)   4  Decreased libido (799 81) (R68 82)   5  Diabetes mellitus (250 00) (E11 9)   6  Dysuria (788 1) (R30 0)   7  Encounter for monitoring rituximab therapy (V58 83,V58 69) (Z51 81,Z79 899)   8  Hyperlipidemia (272 4) (E78 5)   9  ILD (interstitial lung disease) (515) (J84 9)   10  Long term (current) use of systemic steroids (V58 65) (Z79 52)   11   Mitral regurgitation (424  0) (I34 0)   12  History of Need for prophylactic vaccination and inoculation against influenza (V04 81)    (Z23)   13  NSIP (nonspecific interstitial pneumonitis) (516 8) (J84 89)   14  Sjogren's syndrome (710 2) (M35 00)   15  Vitamin D deficiency (268 9) (E55 9)    History of Present Illness  HPI: The patient is here today for followup regarding NSIP  His pulmonary status has been fairly stable  He is now getting Rituxan every 4 months per rheumatology  He feels that it has helped his joints  He continues to have some shortness of breath when he is hired and talks a lot  He denies significant cough, wheeze, sputum production or hemoptysis  He is using Advair twice a day  Review of Systems  Complete-Male Pulm:   Constitutional: No fever or chills, feels well, no tiredness, no recent weight gain or weight loss  Cardiovascular: no palpitations, no chest pain  Respiratory: as noted in HPI  Gastrointestinal: no complaints of esophageal reflux, no abdominal pain  Past Medical History    1  History of Allergic Reaction (995 3)   2  History of Allergic rhinitis (477 9) (J30 9)   3  History of Chest pain (786 50) (R07 9)   4  History of Chronic steroid use (V58 65)   5  History of Constipation (564 00) (K59 00)   6  History of Edema (782 3) (R60 9)   7  History of Esophageal reflux (530 81) (K21 9)   8  History of Herniated cervical disc (722 0) (M50 20)   9  History of hyperlipidemia (V12 29) (Z86 39)   10  History of hypertension (V12 59) (Z86 79)   11  History of shortness of breath (V13 89) (Z87 898)   12  History of Sjogren's disease (V13 59) (Z87 39)   13  History of Insomnia (780 52) (G47 00)   14  History of Myalgia (729 1) (M79 1)   15  History of Need for pneumococcal vaccination (V03 82) (Z23)   16  History of Need for prophylactic vaccination and inoculation against influenza (V04 81)    (Z23)   17  History of Oral thrush (112 0) (B37 0)   18   History of Orthostatic hypotension (458 0) (I95 1)   19  History of Pre-syncope (780 2) (R55)   20  History of Pruritus (698 9) (L29 9)   21  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)   22  History of Pulmonary disease (518 89) (J98 4)   23  History of Screening for colon cancer (V76 51) (Z12 11)   24  History of Special screening examination for neoplasm of prostate (V76 44) (Z12 5)   25  History of Spondylosis of cervical region without myelopathy or radiculopathy (721 0)    (M47 812)   26  History of Staggering gait (781 2) (R26 0)   27  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,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,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)   28  History of Testicular hypogonadism (257 2) (E29 1)   29  History of Transient Visual Loss (368 12)   30  History of Tremor (781 0) (R25 1)   31  History of Type 2 diabetes mellitus (250 00) (E11 9)   32  History of Type 2 diabetes mellitus with hyperglycemia (250 00) (E11 65)   33  History of Urgency of urination (788 63) (R39 15)   34  History of Weight loss (783 21) (R63 4)    Surgical History    1  History of Oral Surgery Tooth Extraction   2  History of Tonsillectomy   3  History of Wedge Resection Of Lung  Surgical History Reviewed: The surgical history was reviewed and updated today  Family History    1  Family history of Diabetes Mellitus (V18 0)   2  Family history of Hypertension (V17 49)    3  Family history of Hypertension (V17 49)    4  Family history of Family Health Status 2  Children Living   5  Family history of Family Health Status Of Brother - Alive   6  Family history of Family Health Status Of Father - Alive   7  Family history of Family Health Status Of Mother - Alive   8  Family history of Family Health Status Of Sister - Alive   5  Denied: Family history of Crohn's disease   10  Denied: Family history of psoriasis   11  Denied: Family history of rheumatoid arthritis   12  Denied: Family history of Sjogren's disease   15  Denied: Family history of systemic lupus erythematosus   14   Denied: Family history of ulcerative colitis  Family History Reviewed: The family history was reviewed and updated today  Social History    · Being A Social Drinker   · Rare   · Daily Coffee Consumption (1  Cups/Day)   · Denied: History of Drug Use   · Educational Level - Completed 2nd [address]   · Living Independently With Spouse   · & (2) children   · Marital History - Currently    · Never A Smoker   · No drug use   · Occupation:   · [employer]    · Sexually Active   · Working Full Time   ·   Social History Reviewed: The social history was reviewed and is unchanged  Current Meds   1  Advair Diskus 250-50 MCG/DOSE Inhalation Aerosol Powder Breath Activated; INHALE 1   PUFF TWICE DAILY  RINSE MOUTH AFTER USE; Therapy: 19QED1315 to (Evaluate:14Oct2016)  Requested for: 0490 51 30 85; Last   Rx:38Rgh3932 Ordered   2  Atorvastatin Calcium 20 MG Oral Tablet; take 1 tablet every day; Therapy: 07FRW6176 to (Evaluate:12Jun2016)  Requested for: 78STT3109; Last   Rx:21Dxk7471 Ordered   3  Hydroxychloroquine Sulfate 200 MG Oral Tablet; take 1 tablet twice daily after meals    Requested for: 76Cpp1150; Last Rx:32Ghr7345 Ordered   4  Methocarbamol 750 MG Oral Tablet; TAKE 1 TABLET EVERY 6 HOURS AS NEEDED; Therapy: 66Xxa3340 to (Last Rx:46Dcu8831)  Requested for: 25Zlz3010 Ordered   5  PredniSONE 5 MG Oral Tablet; take 0 5 tablet daily; Therapy: (Pepito Number) to Recorded   6  Rituxan 10 MG/ML CONC; USE AS DIRECTED; Therapy: 44XDV5237 to Recorded   7  Tamsulosin HCl - 0 4 MG Oral Capsule; TAKE 1 CAPSULE Bedtime; Therapy: 60ZKD7177 to (Evaluate:21Apr2016)  Requested for: ; Last   Rx:27Apr2015 Ordered   8  Terbinafine HCl - 250 MG Oral Tablet; TAKE 1 TABLET DAILY; Therapy: (Recorded:22Yos1191) to Recorded   9  Viagra 100 MG Oral Tablet; TAKE 1 TABLET DAILY 1 HOUR BEFORE NEEDED; Therapy: 18EPV9047 to (Evaluate:09Mar2015);  Last Rx:03Mar2015 Ordered  Medication List Reviewed: The medication list was reviewed and updated today  Allergies    1  CellCept TABS   2  Avelox TABS   3  Axiron SOLN   4  Imuran TABS   5  Zithromax PACK    Vitals  Vital Signs [Data Includes: Current Encounter]    Recorded: 94LTD7133 09:46AM   Temperature 97 9 F   Heart Rate 85   Respiration 14   Systolic 794   Diastolic 70   Height 6 ft 2 in   Weight 156 lb    BMI Calculated 20 03   BSA Calculated 1 96   O2 Saturation 100, RA     Physical Exam    Constitutional   General appearance: No acute distress, well appearing and well nourished  Pulmonary   Respiratory effort: No increased work of breathing or signs of respiratory distress  Auscultation of lungs: Clear to auscultation, no rales, no crackles, no wheezing  Cardiovascular   Auscultation of heart: Normal rate and rhythm, normal S1 and S2, no murmurs  Abdomen   Abdomen: Soft, non-tender  Lymphatic   Palpation of lymph nodes in neck: No lymphadenopathy  Neurologic   Mental Status: Normal  Not confused, no evidence of dementia, good comprehension, good concentration  Skin   Skin and subcutaneous tissue: Limited exam shows no rash  Psychiatric   Orientation to person, place and time: Normal     Mood and affect: Normal        Health Management  Diabetes mellitus   *VB - Eye Exam; every 1 year; Last 30BGR4722; Next Due: 48NDO9605; Overdue  *VB-Foot Exam; every 1 year; Next Due: 87GMM4146; Overdue    Thank you very much for allowing me to participate in the care of this patient  If you have any questions, please do not hesitate to contact me        Future Appointments    Date/Time Provider Specialty Site   02/29/2016 10:45 AM Hector Khan DO Family Medicine [address]   03/02/2016 10:20 AM Kelli Narvaez DO Rheumatology [facility]   06/13/2016 09:30 AM José Garcia Urology [facility]     Signatures   Electronically signed by : Maylene Hodgkin, DO; Jan 26 2016 10:49AM EST (Author)

## 2018-01-15 NOTE — PROGRESS NOTES
Assessment    1  Sjogren's syndrome (710 2) (M35 00)   2  ILD (interstitial lung disease) (515) (J84 9)   3  Encounter for monitoring rituximab therapy (V58 83,V58 69) (Z51 81,Z79 899)   4  Long-term use of hydroxychloroquine (V58 69) (Z79 899)   5  Long term (current) use of systemic steroids (V58 65) (Z79 52)   6  Benign essential hypertension (401 1) (I10)   7  Cardiomyopathy (425 4) (I42 9)    Plan    1  Follow-up visit in 2 months Evaluation and Treatment  Follow-up  Status: Hold For -   Scheduling  Requested for: 53ZDV8481    2  Hydroxychloroquine Sulfate 200 MG Oral Tablet; take 1 tablet twice daily after   meals   3  Call [phone] if: The pain seems worse ; Status:Complete;   Done: 49KBK9339   4  Call [phone] if: The symptoms seem worse ; Status:Complete;   Done:   94DLQ3733   5  Call [phone] if: You have questions or concerns about your problem ;   Status:Complete;   Done: 37KEP1674    6  Rituxan 10 MG/ML CONC; USE AS DIRECTED    Discussion/Summary    This is a 59-year-old gentleman presenting today for follow-up for Sjogren's syndrome as well as interstitial lung disease  The patient states that he is due for his next Rituxan infusion and has noticed increased discomfort in his elbows and his hips  He states he also has noticed a tightness in his chest and has noticed some changes with his breathing  He states that he has not experienced any further joint pain and denies any obvious joint swelling  He states he has no morning stiffness but does describe difficulty with sleep  He describes nonrestorative sleep on occasion and also describes occasional fatigue  He states that this is usually dependent on his activity  His last infusion of Rituxan was in June of this year  On physical examination, there is no synovitis  Patient does have tenderness of the left elbow  He also has crepitus of bilateral knees  Patient has not had labs since his last appointment   At this time patient's history and physical examination is most consistent with Sjogren syndrome as well as interstitial lung disease which appears to be mildly active at this time  Patient is due for his next Rituxan infusion which we will plan to perform on an every 4 month cycle  We will plan to continue with hydroxychloroquine at its current dose and we'll not make any changes at this time  We will repeat a CBC, CMP, CRP, and ESR at patient's next infusion  In the future if patient continues to have increased joint pain or swelling we will consider additions of further medications including leflunomide  Patient will call in the interim if he has any further questions or concerns  The patient was counseled regarding instructions for management, prognosis, patient and family education, risks and benefits of treatment options, importance of compliance with treatment  Chief Complaint  F/U Sjogren's   Patient is here today for follow up of chronic conditions described in HPI  History of Present Illness  Patient is in the office today for follow up for Sjogren's/ILD  Pain in the hips  Noticing issues with breathing more so  Feeling pressure in the chest  Some discomfort in the elbows as well  No other joint pain  No obvious joint swelling  No Am stiffness  +Difficulty with sleep  +Occasional non restorative sleep  +Occasional fatigue depending on activity  Last infusion was in June, 2016  RAPID3: /30      Review of Systems    Constitutional: fatigue, but no fever, no recent weight gain, no chills, no recent weight loss and no anorexia  HEENT: no blurred vision, no double vision, no amaurosis fugax, no dryness of the eyes, no eye pain, no erythema eye(s), no dryness mouth, no mouth sores, not feeling congested and no sore throat  Cardiovascular: no dyspnea on exertion and no swelling in the arms or legs    The patient presents with complaints of chest pain or pressure, described as heavy     Respiratory: shortness of breath, but no pleurisy    The patient presents with complaints of unusual or persistent cough (+clear sputum  )  Gastrointestinal: constipation, but no abdominal pain, no nausea, no vomiting, no heartburn, no diarrhea, no melena and no BRBPR  Genitourinary: No foamy urine, but no dysuria and no hematuria  Musculoskeletal: as noted in HPI  Integumentary nail changes, but no rash, no Raynaud's, no alopecia and no photosensitivity  Endocrine no polyuria and no polydipsia  Hematologic/Lymphatic: no unusual bleeding and no tendency for easy bruising  Neurological: tingling, but no headache, no vertigo or dizziness and no weakness  Psychiatric: insomnia, but no non-restorative sleep  Active Problems    1  Benign essential hypertension (401 1) (I10)   2  Cardiomyopathy (425 4) (I42 9)   3  Decreased libido (799 81) (R68 82)   4  Diabetes mellitus (250 00) (E11 9)   5  Encounter for monitoring rituximab therapy (V58 83,V58 69) (Z51 81,Z79 899)   6  Enlarged prostate without lower urinary tract symptoms (luts) (600 00) (N40 0)   7  Erectile dysfunction (607 84) (N52 9)   8  Hyperlipidemia (272 4) (E78 5)   9  ILD (interstitial lung disease) (515) (J84 9)   10  Lateral epicondylitis of right elbow (726 32) (M77 11)   11  Long term (current) use of systemic steroids (V58 65) (Z79 52)   12  Long-term use of hydroxychloroquine (V58 69) (Z79 899)   13  Mild mitral regurgitation (424 0) (I34 0)   14  Need for influenza vaccination (V04 81) (Z23)   15  NSIP (nonspecific interstitial pneumonitis) (516 8) (J84 89)   16  Sjogren's syndrome (710 2) (M35 00)   17  Vitamin D deficiency (268 9) (E55 9)    Past Medical History    1  History of Allergic Reaction (995 3)   2  History of Allergic rhinitis (477 9) (J30 9)   3  History of Chest pain (786 50) (R07 9)   4  History of Chronic steroid use (V58 65)   5  History of Constipation (564 00) (K59 00)   6  History of Edema (782 3) (R60 9)   7   History of Esophageal reflux (530 81) (K21 9)   8  History of Herniated cervical disc (722 0) (M50 20)   9  History of hyperlipidemia (V12 29) (Z86 39)   10  History of hypertension (V12 59) (Z86 79)   11  History of shortness of breath (V13 89) (Z87 898)   12  History of Sjogren's disease (V13 59) (Z87 39)   13  History of Insomnia (780 52) (G47 00)   14  History of Myalgia (729 1) (M79 1)   15  History of Need for pneumococcal vaccination (V03 82) (Z23)   16  History of Oral thrush (112 0) (B37 0)   17  History of Orthostatic hypotension (458 0) (I95 1)   18  History of Pre-syncope (780 2) (R55)   19  History of Pruritus (698 9) (L29 9)   20  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)   21  History of Pulmonary disease (518 89) (J98 4)   22  History of Screening for colon cancer (V76 51) (Z12 11)   23  History of Special screening examination for neoplasm of prostate (V76 44) (Z12 5)   24  History of Spondylosis of cervical region without myelopathy or radiculopathy (721 0)    (M47 812)   25  History of Staggering gait (781 2) (R26 0)   26  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,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,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)   27  History of Testicular hypogonadism (257 2) (E29 1)   28  History of Transient Visual Loss (368 12)   29  History of Tremor (781 0) (R25 1)   30  History of Type 2 diabetes mellitus (250 00) (E11 9)   31  History of Type 2 diabetes mellitus with hyperglycemia (250 00) (E11 65)   32  History of Urgency of urination (788 63) (R39 15)   33  History of Weight loss (783 21) (R63 4)    The active problems and past medical history were reviewed and updated today  Surgical History    1  History of Oral Surgery Tooth Extraction   2  History of Tonsillectomy   3  History of Wedge Resection Of Lung    The surgical history was reviewed and updated today  Family History  Mother    1  Family history of Diabetes Mellitus (V18 0)   2  Family history of Hypertension (V17 49)  Father    3   Family history of Hypertension (V17 49)  Sister    4  Family history of arthritis (V17 7) (Z82 61)   5  Family history of malignant neoplasm of breast (V16 3) (Z80 3)  Family History    6  Family history of Family Health Status 2  Children Living   7  Family history of Family Health Status Of Brother - Alive   8  Family history of Family Health Status Of Father - Alive   5  Family history of Family Health Status Of Mother - Alive   8  Family history of Family Health Status Of Sister - Alive   6  Denied: Family history of Crohn's disease   12  Denied: Family history of psoriasis   13  Denied: Family history of rheumatoid arthritis   14  Denied: Family history of Sjogren's disease   13  Denied: Family history of systemic lupus erythematosus   16  Denied: Family history of ulcerative colitis    The family history was reviewed and updated today  Social History    · Being A Social Drinker   · Daily Coffee Consumption (1  Cups/Day)   · Denied: History of Drug Use   · Educational Level - Completed 2nd [address] [employer]   · Living Independently With Spouse   · Marital History - Currently    · Never A Smoker   · No drug use   · Occupation:   · Sexually Active   · Working Full Time  The social history was reviewed and updated today  The social history was reviewed and is unchanged  Current Meds   1  Advair Diskus 250-50 MCG/DOSE Inhalation Aerosol Powder Breath Activated; INHALE 1   PUFF TWICE DAILY  RINSE MOUTH AFTER USE; Therapy: 20EZE7086 to (Evaluate:14Oct2016)  Requested for: 0490 51 30 85; Last   Rx:96Hch9077 Ordered   2  Atorvastatin Calcium 20 MG Oral Tablet; take 1 tablet every day; Therapy: 46CWB5546 to (Evaluate:03Jan2017)  Requested for: 62EDF8369; Last   Rx:41Dud8054 Ordered   3  Hydroxychloroquine Sulfate 200 MG Oral Tablet; take 1 tablet twice daily after meals    Requested for: 93Vzq7461; Last LI:45IPU5106 Ordered   4  Methocarbamol 750 MG Oral Tablet; TAKE 1 TABLET EVERY 6 HOURS AS NEEDED;    Therapy: 37Emc3164 to (Last Rx:43Kqy6678)  Requested for: 92Oix2672 Ordered   5  PredniSONE 2 5 MG Oral Tablet; take 1/2 tablet daily; Therapy: (Recorded:2016) to Recorded   6  Rituxan 10 MG/ML CONC; USE AS DIRECTED; Therapy: 48CUY4414 to Recorded   7  Tamsulosin HCl - 0 4 MG Oral Capsule; TAKE 1 CAPSULE Bedtime; Therapy: 20YWT4351 to (Evaluate:2017)  Requested for: 32CFH7778; Last   Rx:22Kys4373 Ordered   8  Viagra 100 MG Oral Tablet; TAKE 1 TABLET DAILY 1 HOUR BEFORE NEEDED; Therapy: 51CEE1690 to (Evaluate:2015); Last Rx:2015 Ordered    The medication list was reviewed and updated today  Immunizations  Influenza --- Jamison Cinnamon: 45-Qtf-9346Hmpcegc Splinter: 2014; Series3: 83-Zfz-3195Znsly Mournin-Oct-2016; Series5: 01-Sep-2012   PCV --- Series1: 16-Oct-2015   PPSV --- Series1: 01-Aug-2012     Allergies    1  CellCept TABS   2  Avelox TABS   3  Axiron SOLN   4  Imuran TABS   5  Zithromax PACK    Vitals  Signs   Recorded: 64QCM6069 09:91EV   Systolic: 148  Diastolic: 60  Heart Rate: 88  Weight: 155 lb   BMI Calculated: 19 9  BSA Calculated: 1 95    Physical Exam    Constitutional   General appearance: Abnormal   underweight  Eyes   Conjunctiva and lids: No swelling, erythema or discharge  Pupils and irises: Equal, round and reactive to light  Ears, Nose, Mouth, and Throat   External inspection of ears and nose: Normal     Oropharynx: Abnormal   Oral mucosa was dry  Pulmonary   Respiratory effort: No increased work of breathing or signs of respiratory distress  Auscultation of lungs: Clear to auscultation  Cardiovascular   Auscultation of heart: Normal rate and rhythm, normal S1 and S2, without murmurs  Examination of extremities for edema and/or varicosities: Normal     Lymphatic   Palpation of lymph nodes in neck: No lymphadenopathy  Psychiatric   Orientation to person, place, and time: Normal     Mood and affect: Normal         Left Elbow tenderness  Right Upper Extremity: Normal ROM     Left Upper Extremity: Normal ROM  Right Lower Extremity: Normal ROM  Left Lower Extremity: Normal ROM  Musculoskeletal - Joints, bones, and muscles: Abnormal  Palpation - bilateral knee crepitus  Right hand: All MCP, PIP and DIP joints are normal  Left Hand: All MCP, PIP and DIP joints are normal    Right foot: All MTP, PIP and DIP joints are normal  Left foot: All MTP, PIP and DIP joints are normal       Health Management  Diabetes mellitus   *VB - Eye Exam; every 1 year; Last 02TYJ9947; Next Due: 47TQS5107; Overdue  *VB - Foot Exam; every 1 year; Next Due: 23BHX4172; Overdue    Attending Note  Collaborating Physician: I did not interview and examine the patient, I discussed the case with the Advanced Practitioner and reviewed the note and I agree with the Advanced Practitioner note        Future Appointments    Date/Time Provider Specialty Site   12/13/2016 09:00 AM Yoko Garduno DO Family Medicine [address]   12/13/2016 10:40 AM Cyrus Mukherjee DO Rheumatology [facility]   06/12/2017 03:00 PM José Steele [facility] Urolog[address]   Electronically signed by : MADISON Yates; Oct 13 2016 10:47AM EST                       (Author)    Electronically signed by : Criss Clark DO; Oct 13 2016 11:01AM EST                       (Author)

## 2018-01-16 NOTE — PROGRESS NOTES
Assessment    1  Sjogren's syndrome (710 2) (M35 00)   2  ILD (interstitial lung disease) (515) (J84 9)   3  Vitamin D deficiency (268 9) (E55 9)   4  Long-term use of hydroxychloroquine (V58 69) (Z79 899)   5  Encounter for monitoring rituximab therapy (V58 83,V58 69) (Z51 81,Z79 899)   6  Cardiomyopathy (425 4) (I42 9)   7  Benign essential hypertension (401 1) (I10)    Plan    1  Follow-up visit in 4 Months Evaluation and Treatment  Follow-up  Status: Hold For -   Scheduling  Requested for: 70QXL3999   2  Call [phone] if: The pain seems worse ; Status:Complete;   Done: 24YGK4436   3  Call [phone] if: The symptoms are suddenly worse ; Status:Complete;   Done:   06NUP8216   4  Call [phone] if: You have questions or concerns about your problem ;   Status:Complete;   Done: 27KQB8291    Discussion/Summary    Mr Radha Noble has been feeling well since his last evaluation  He unfortunately did not have any laboratory studies drawn before this visit  He is scheduled to have his next dose of Rituxan today  He complains of some achiness in his hips when getting out of a car, as well as some neck discomfort  He does carry a diagnosis of degenerative disc disease of the cervical spine  He will utilize ice with some relief of his symptoms  He occasionally utilizes a muscle relaxer for his pain with some relief as well  He denies any obvious joint swelling  He denies any morning stiffness  He denies any difficulty sleeping due to pain  He does report occasional nonrestorative sleep, but he denies any significant fatigue  He is now off of prednisone, but continues to utilize Plaquenil  His last eye exam for the Plaquenil use was over one year ago  He does report that about 3 months after he receives the Rituxan, he does have some increased symptoms, including mild dyspnea, fatigue, and mild arthralgias  On exam, there is no active synovitis   He does have some paraspinal spasm noted in the cervical spine area  There is no objective muscular weakness  No new labs were available for review today  At this time, his Sjogren syndrome with interstitial lung disease does appear relatively well controlled with the use of Plaquenil and Rituxan infusions which she receives every 4 months  I will not make any changes to his medications at this time  He does have a laboratory slip, so I asked him to obtain these labs within the next several weeks  We will contact him if there are any significant abnormalities  I will reevaluate him in 4 months, at which time we will check updated laboratory studies  He will call in the interim if there are any questions or concerns  Patient is able to Self-Care  Counseling  Rheumatology Counseling Documentation: The patient was counseled regarding instructions for management, impressions and risks and benefits of treatment options  Chief Complaint  F/U Sjogren's with ILD   Patient is here today for follow up of chronic conditions described in HPI  History of Present Illness  Pt  returns for F/U for Sjogren's with ILD  Feeling well since last visit  Did not have labs before F/U today  To have next round of Rituxan starting today  c/o achiness in hips when getting out of a car  Also with some neck discomfort  Dx'ed with DDD in C-spine  Uses ice with some relief  Occasionally takes muscle relaxer for pain with some relief  No obvious joint swelling  No significant AM stiffness  No difficulty sleeping due to pain  + occasional non-restorative sleep  No significant fatigue  Off Prednisone now  Last eye exam over 1 year ago  RAPID3: 5 8/30      Review of Systems    Constitutional: recent 5-6 lb weight gain, but no fever, fatigue, no chills, no recent weight loss and no anorexia  HEENT: blurred vision and feeling congested, but no double vision, no amaurosis fugax, no dryness of the eyes, no eye pain, no erythema eye(s), no dryness mouth, no mouth sores and no sore throat  Cardiovascular: dyspnea on exertion, but no chest pain or pressure and no swelling in the arms or legs  Respiratory: no unusual or persistent cough, no shortness of breath and no pleurisy  Gastrointestinal: constipation, but no abdominal pain, no nausea, no vomiting, no heartburn, no diarrhea, no melena and no BRBPR  Genitourinary: no foamy urine, but no dysuria and no hematuria  Musculoskeletal: as noted in HPI  Integumentary nail changes, but no rash, no Raynaud's, no alopecia and no photosensitivity  Endocrine no polyuria and no polydipsia  Hematologic/Lymphatic: no unusual bleeding and no tendency for easy bruising  Neurological: no headache, no vertigo or dizziness, no tingling and no weakness  Psychiatric: non-restorative sleep, but no insomnia  Active Problems    1  Benign essential hypertension (401 1) (I10)   2  Cardiomyopathy (425 4) (I42 9)   3  Decreased libido (799 81) (R68 82)   4  Encounter for monitoring rituximab therapy (V58 83,V58 69) (Z51 81,Z79 899)   5  Enlarged prostate without lower urinary tract symptoms (luts) (600 00) (N40 0)   6  Erectile dysfunction (607 84) (N52 9)   7  Hyperlipidemia (272 4) (E78 5)   8  ILD (interstitial lung disease) (515) (J84 9)   9  Lateral epicondylitis of right elbow (726 32) (M77 11)   10  Long term (current) use of systemic steroids (V58 65) (Z79 52)   11  Long-term use of hydroxychloroquine (V58 69) (Z79 899)   12  Mild mitral regurgitation (424 0) (I34 0)   13  NSIP (nonspecific interstitial pneumonitis) (516 8) (J84 89)   14  Sjogren's syndrome (710 2) (M35 00)   15  Vitamin D deficiency (268 9) (E55 9)    Past Medical History    1  History of Allergic Reaction (995 3)   2  History of Allergic rhinitis (477 9) (J30 9)   3  History of Chest pain (786 50) (R07 9)   4  History of Chronic steroid use (V58 65)   5  History of Colon cancer screening (V76 51) (Z12 11)   6  History of Constipation (564 00) (K59 00)   7   History of Edema (782  3) (R60 9)   8  History of Esophageal reflux (530 81) (K21 9)   9  History of Herniated cervical disc (722 0) (M50 20)   10  History of hyperlipidemia (V12 29) (Z86 39)   11  History of hypertension (V12 59) (Z86 79)   12  History of shortness of breath (V13 89) (Z87 898)   13  History of Sjogren's disease (V13 59) (Z87 39)   14  History of Insomnia (780 52) (G47 00)   15  History of Myalgia (729 1) (M79 1)   16  History of Need for influenza vaccination (V04 81) (Z23)   17  History of Need for pneumococcal vaccination (V03 82) (Z23)   18  History of Oral thrush (112 0) (B37 0)   19  History of Orthostatic hypotension (458 0) (I95 1)   20  History of Pre-syncope (780 2) (R55)   21  History of Pruritus (698 9) (L29 9)   22  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)   23  History of Pulmonary disease (518 89) (J98 4)   24  History of Screening for colon cancer (V76 51) (Z12 11)   25  History of Special screening examination for neoplasm of prostate (V76 44) (Z12 5)   26  History of Spondylosis of cervical region without myelopathy or radiculopathy (721 0)    (M47 812)   27  History of Staggering gait (781 2) (R26 0)   28  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,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,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)   29  History of Testicular hypogonadism (257 2) (E29 1)   30  History of Transient Visual Loss (368 12)   31  History of Tremor (781 0) (R25 1)   32  History of Type 2 diabetes mellitus (250 00) (E11 9)   33  History of Type 2 diabetes mellitus with hyperglycemia (250 00) (E11 65)   34  History of Urgency of urination (788 63) (R39 15)   35  History of Weight loss (783 21) (R63 4)    The active problems and past medical history were reviewed and updated today  Surgical History    1  History of Oral Surgery Tooth Extraction   2  History of Tonsillectomy   3  History of Wedge Resection Of Lung    The surgical history was reviewed and updated today  Family History  Mother    1   Family history of Diabetes Mellitus (V18 0)   2  Family history of Hypertension (V17 49)  Father    3  Family history of Hypertension (V17 49)  Sister    4  Family history of arthritis (V17 7) (Z82 61)   5  Family history of malignant neoplasm of breast (V16 3) (Z80 3)  Family History    6  Family history of Family Health Status 2  Children Living   7  Family history of Family Health Status Of Brother - Alive   8  Family history of Family Health Status Of Father - Alive   5  Family history of Family Health Status Of Mother - Alive   8  Family history of Family Health Status Of Sister - Alive   6  Denied: Family history of Crohn's disease   12  Denied: Family history of psoriasis   13  Denied: Family history of rheumatoid arthritis   14  Denied: Family history of Sjogren's disease   13  Denied: Family history of systemic lupus erythematosus   16  Denied: Family history of ulcerative colitis    The family history was reviewed and updated today  Social History    · Being A Social Drinker   · Daily Coffee Consumption (1  Cups/Day)   · Denied: History of Drug Use   · Educational Level - Completed Approva   · Living Independently With Spouse   · Marital History - Currently    · Never A Smoker   · No drug use   · Occupation:   · Sexually Active   · Working Full Time  The social history was reviewed and updated today  The social history was reviewed and is unchanged  Current Meds   1  Atorvastatin Calcium 20 MG Oral Tablet; take 1 tablet every day; Therapy: 43LSN1359 to (Evaluate:06Woa0849)  Requested for: 43QQZ0690; Last   QS:19SGU8180 Ordered   2  Hydroxychloroquine Sulfate 200 MG Oral Tablet; take 1 tablet twice daily after meals    Requested for: 20Mar2017; Last Rx:20Mar2017 Ordered   3  Methocarbamol 750 MG Oral Tablet; take 1 tablet every 6 hours as needed; Therapy: 36Iid9419 to (Evaluate:11Pld2103)  Requested for: 75JPJ5698; Last   Rx:89Mfi7463 Ordered   4  Rituxan 10 MG/ML CONC; USE AS DIRECTED;    Therapy: 66ABN6164 to Recorded   5  Tamsulosin HCl - 0 4 MG Oral Capsule; TAKE 1 CAPSULE AT BEDTIME; Therapy: 27Apr2015 to (Evaluate:89Ugv0994)  Requested for: 25RFR3808; Last   MK:82WJA9980 Ordered   6  Viagra 100 MG Oral Tablet; TAKE 1 TABLET DAILY 1 HOUR BEFORE NEEDED; Therapy: 87ACF1430 to (Evaluate:09Mar2015); Last Rx:03Mar2015 Ordered    The medication list was reviewed and updated today  Immunizations  Influenza --- Sudie Hannah: [id number] Dessert: Nov 2014; Series3: 13-Dfq-2387Gkvzc Hernanly:  06-Oct-2016; Series5: 01-Sep-2012   PCV --- Series1: 16-Oct-2015   PPSV --- Series1: 01-Aug-2012     Allergies    1  CellCept TABS   2  Avelox TABS   3  Axiron SOLN   4  Imuran TABS   5  Zithromax PACK    Vitals  Signs   Recorded: 06NFS7626 08:29AM   Heart Rate: 84  Systolic: 465  Diastolic: 80  Height: 6 ft 2 in  Weight: 164 lb 2 oz  BMI Calculated: 21 07  BSA Calculated: 2    Physical Exam    Constitutional   General appearance: No acute distress, well appearing and well nourished  Eyes   Conjunctiva and lids: No swelling, erythema or discharge  Pupils and irises: Equal, round and reactive to light  Ears, Nose, Mouth, and Throat   External inspection of ears and nose: Normal     Oropharynx: Abnormal   Oral mucosa was dry  Pulmonary   Respiratory effort: No increased work of breathing or signs of respiratory distress  Auscultation of lungs: Clear to auscultation  Cardiovascular   Auscultation of heart: Normal rate and rhythm, normal S1 and S2, without murmurs  Examination of extremities for edema and/or varicosities: Normal     Lymphatic   Palpation of lymph nodes in neck: No lymphadenopathy      Psychiatric   Orientation to person, place, and time: Normal     Mood and affect: Normal         Right Upper Extremity: All normal    Left Upper Extremity: All normal    Right Lower Extremity: All normal    Left Lower Extremity: All normal    Musculoskeletal - Muscle strength/tone: Normal  Motor Strength Findings: normal upper extremity strength and normal lower extremity strength  Right upper extremity: shoulder flexion 5/5, shoulder extension 5/5, biceps 5/5, triceps 5/5, but normal hand   Left upper extremity shoulder flexion 5/5, shoulder extension 5/5, biceps 5/5, triceps 5/5, but normal hand   Right lower extremity strength: hip flexion 5/5, hip abduction 5/5, hip adduction 5/5, knee flexion 5/5, knee extension 5/5, ankle dorsiflexion 5/5, ankle plantar flexion 5/5  Left lower extremity strength: hip flexion 5/5, hip abduction 5/5, hip adduction 5/5, knee flexion 5/5, knee extension 5/5, ankle dorsiflexion 5/5, ankle plantar flexion 5/5  Skin - Skin and subcutaneous tissue: Normal    Neurologic - Sensation: Normal    Additional Findings - + paraspinal spasm C-spine  Health Management  History of diabetes mellitus   *[facility] - Eye Exam; every 1 year; Last 22TLB7948; Next Due: 30XNK3105; Overdue  *VB - Foot Exam; every 1 year; Next Due: 00NEX7894;  Overdue    Future Appointments    Date/Time Provider Specialty Site   09/25/2017 08:45 AM Bunny Lackey DO Family Medicine [address]   10/24/2017 08:20 AM Jose Baker DO Rheumatology  [address] [facility]     Signatures   Electronically signed by : Maryann Aaron DO; Jun 22 2017  8:56AM EST                       (Author)

## 2018-01-16 NOTE — RESULT NOTES
Verified Results  * XR CHEST PA & LATERAL [id number] 05:51PM Shabana Saini Order Number: [id number]     Test Name Result Flag Reference   XR CHEST PA & LATERAL (Report)     CHEST     INDICATION: Cough  Shortness of breath  Interstitial lung disease  COMPARISON: April 22, 2015  February 10, 2016     VIEWS: Frontal and lateral projections; 3 images     FINDINGS:        Cardiomediastinal silhouette appears unremarkable  Radiographically subtle bibasilar subpleural reticular changes are similar in appearance when compared with prior examinations  The lungs are otherwise clear  No pneumothorax or pleural effusion  Visualized osseous structures appear within normal limits for the patient's age  IMPRESSION:     Radiographically subtle bibasilar subpleural reticular changes are similar in appearance when compared with prior examinations  Lungs are otherwise clear         Workstation performed: [id number]     Signed by:   Petr Mcduffie MD   10/12/17

## 2018-01-17 NOTE — PROGRESS NOTES
Assessment    1  Sjogren's syndrome (710 2) (M35 00)   2  NSIP (nonspecific interstitial pneumonitis) (516 8) (J84 89)   3  ILD (interstitial lung disease) (515) (J84 9)   4  Lateral epicondylitis of right elbow (726 32) (M77 11)   5  Vitamin D deficiency (268 9) (E55 9)   6  Long term (current) use of systemic steroids (V58 65) (Z79 52)   7  Encounter for monitoring rituximab therapy (V58 83,V58 69) (Z51 81,Z79 899)   8  Diabetes mellitus (250 00) (E11 9)   9  Benign essential hypertension (401 1) (I10)   10  Hyperlipidemia (272 4) (E78 5)    Plan    1  PredniSONE 2 5 MG Oral Tablet; take 2 tablet daily   2  (1) CBC/PLT/DIFF; Status:Active; Requested for:19Apr2016;    3  (1) COMPREHENSIVE METABOLIC PANEL; Status:Active; Requested for:19Apr2016;    4  (1) C-REACTIVE PROTEIN; Status:Active; Requested for:19Apr2016;    5  (1) QUANTIFERON - TB GOLD; Status:Active; Requested for:19Apr2016;    6  (1) SED RATE; Status:Active; Requested for:19Apr2016;    7  Follow-up visit in 6 weeks Evaluation and Treatment  Follow-up  Status: Hold For -   Scheduling  Requested for: 19Apr2016    8  Hydroxychloroquine Sulfate 200 MG Oral Tablet; take 1 tablet twice daily after   meals   9  Call [phone] if: The pain seems worse ; Status:Complete;   Done: 19Apr2016   10  Call [phone] if: You have questions or concerns about your problem ;    Status:Complete;   Done: 19Apr2016    11  PredniSONE 5 MG Oral Tablet   12  Rituxan 10 MG/ML CONC; USE AS DIRECTED    Discussion/Summary    Mr Beltran Parekh has been feeling worse since his last visit  He does report over the last 2 months, he has had increasing pain mainly in his elbows, hips, knees, as well as decreased strength in his right hand  He reports significant stiffness in his elbow when sitting in certain positions for prolonged periods of time  He reports overall jelling when sitting for prolonged periods   He did follow-up with pulmonary, and had his prednisone decreased to 2 5 mg daily  Since that time, he has felt that his breathing has been slightly more labored  He states that he received his last Rituxan back in November  He denies any obvious joint swelling or difficulty sleeping because of pain, but he does report nonrestorative sleep and fatigue  He has occasionally been taking Tylenol for his discomfort with some relief  He does report some mild morning soreness which lasts several minutes before resolution  On exam, there is mild synovitis of the bilateral elbows, with tenderness of the right elbow joint, as well as the right lateral epicondyles  There is mild synovitis of the PIPs as well  There is no other active synovitis  His lungs are clear to auscultation today  No recent rheumatologic labs were available for review today  At this time, his Sjogren syndrome does appear mildly active with increased shortness of breath from his ILD, as well as some mild synovitis  We will plan to repeat the Rituxan infusions at this time, as he is approximately 5 months out from his last dose  If he does not have an adequate response to these infusions, we could consider adding another DMARD such as CellCept to help control his joint and pulmonary symptoms  I would like to recheck a CBC, CMP, ESR, CRP, and QuantiFERON Gold for TB before his follow-up  I will reevaluate him in 6 weeks  He will call in the interim if there are any questions or concerns  Counseling  Rheumatology Counseling Documentation: The patient was counseled regarding instructions for management, impressions and risks and benefits of treatment options  Chief Complaint  F/U Sjogren's   Patient is here today for follow up of chronic conditions described in HPI  History of Present Illness  Pt  returns for F/U for Sjogren's  Feeling worse since last visit   Over the last 2 months, he has been having significant pain in the elbows, hips, knees, and decreased strength in R hand  + gelling when sitting for periods of time  SOB has increased a little since the last visit  Decreased Prednisone to 2 5mg daily  Last Rituxan in November  No difficulty sleeping due to pain  + non-restorative sleep  + fatigue  No obvious joint swelling  Occasionally takes Tylenol as needed for pain with some relief  + AM stiffness x several minutes  RAPID3: 14 0/30      Review of Systems    Constitutional: + night sweats (resolved), fatigue and recent 5 lb weight loss, but no fever, no recent weight gain, no chills and no anorexia  HEENT: blurred vision, but no double vision, no amaurosis fugax, no dryness of the eyes, no eye pain, no erythema eye(s), no dryness mouth, no mouth sores, not feeling congested and no sore throat  Cardiovascular: dyspnea on exertion, but no chest pain or pressure and no swelling in the arms or legs  Respiratory: sputum, but no shortness of breath and no pleurisy    The patient presents with complaints of nocturnal episodes of unusual or persistent cough (+ occasional clear sputum)  Gastrointestinal: no abdominal pain, no nausea, no vomiting, no heartburn, no diarrhea, no constipation, no melena and no BRBPR  Genitourinary: no foamy urine, but no dysuria and no hematuria  Musculoskeletal: as noted in HPI  Integumentary no rash, no Raynaud's, no alopecia, no nail changes and no photosensitivity  Endocrine no polyuria and no polydipsia  Hematologic/Lymphatic: no unusual bleeding and no tendency for easy bruising  Neurological: weakness, but no headache, no vertigo or dizziness and no tingling  Psychiatric: non-restorative sleep, but no insomnia  Active Problems    1  Benign essential hypertension (401 1) (I10)   2  BPH (benign prostatic hyperplasia) (600 00) (N40 0)   3  Cardiomyopathy (425 4) (I42 9)   4  Decreased libido (799 81) (R68 82)   5  Diabetes mellitus (250 00) (E11 9)   6  Encounter for monitoring rituximab therapy (V58 83,V58 69) (Z51 81,Z79 899)   7   Hyperlipidemia (272  4) (E78 5)   8  ILD (interstitial lung disease) (515) (J84 9)   9  Long term (current) use of systemic steroids (V58 65) (Z79 52)   10  Mitral regurgitation (424 0) (I34 0)   11  NSIP (nonspecific interstitial pneumonitis) (516 8) (J84 89)   12  Sjogren's syndrome (710 2) (M35 00)   13  Special screening examination for neoplasm of prostate (V76 44) (Z12 5)   14  Vitamin D deficiency (268 9) (E55 9)    Past Medical History    1  History of Allergic Reaction (995 3)   2  History of Allergic rhinitis (477 9) (J30 9)   3  History of Chest pain (786 50) (R07 9)   4  History of Chronic steroid use (V58 65)   5  History of Constipation (564 00) (K59 00)   6  History of Edema (782 3) (R60 9)   7  History of Esophageal reflux (530 81) (K21 9)   8  History of Herniated cervical disc (722 0) (M50 20)   9  History of hyperlipidemia (V12 29) (Z86 39)   10  History of hypertension (V12 59) (Z86 79)   11  History of shortness of breath (V13 89) (Z87 898)   12  History of Sjogren's disease (V13 59) (Z87 39)   13  History of Insomnia (780 52) (G47 00)   14  History of Myalgia (729 1) (M79 1)   15  History of Need for pneumococcal vaccination (V03 82) (Z23)   16  History of Oral thrush (112 0) (B37 0)   17  History of Orthostatic hypotension (458 0) (I95 1)   18  History of Pre-syncope (780 2) (R55)   19  History of Pruritus (698 9) (L29 9)   20  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)   21  History of Pulmonary disease (518 89) (J98 4)   22  History of Screening for colon cancer (V76 51) (Z12 11)   23  History of Spondylosis of cervical region without myelopathy or radiculopathy (721 0)    (M47 812)   24  History of Staggering gait (781 2) (R26 0)   25  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,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,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)   26  History of Testicular hypogonadism (257 2) (E29 1)   27  History of Transient Visual Loss (368 12)   28  History of Tremor (781 0) (R25 1)   29   History of Type 2 diabetes mellitus (250 00) (E11 9)   30  History of Type 2 diabetes mellitus with hyperglycemia (250 00) (E11 65)   31  History of Urgency of urination (788 63) (R39 15)   32  History of Weight loss (783 21) (R63 4)    The active problems and past medical history were reviewed and updated today  Surgical History    1  History of Oral Surgery Tooth Extraction   2  History of Tonsillectomy   3  History of Wedge Resection Of Lung    The surgical history was reviewed and updated today  Family History    1  Family history of Diabetes Mellitus (V18 0)   2  Family history of Hypertension (V17 49)    3  Family history of Hypertension (V17 49)    4  Family history of Family Health Status 2  Children Living   5  Family history of Family Health Status Of Brother - Alive   6  Family history of Family Health Status Of Father - Alive   7  Family history of Family Health Status Of Mother - Alive   8  Family history of Family Health Status Of Sister - Alive   5  Denied: Family history of Crohn's disease   10  Denied: Family history of psoriasis   11  Denied: Family history of rheumatoid arthritis   12  Denied: Family history of Sjogren's disease   15  Denied: Family history of systemic lupus erythematosus   14  Denied: Family history of ulcerative colitis    The family history was reviewed and updated today  Social History    · Being A Social Drinker   · Daily Coffee Consumption (1  Cups/Day)   · Denied: History of Drug Use   · Educational Level - [facility] [address] [employer]   · Living Independently With Spouse   · Marital History - Currently    · Never A Smoker   · No drug use   · Occupation:   · Sexually Active   · Working Full Time  The social history was reviewed and updated today  The social history was reviewed and is unchanged  Current Meds   1  Advair Diskus 250-50 MCG/DOSE Inhalation Aerosol Powder Breath Activated; INHALE 1   PUFF TWICE DAILY  RINSE MOUTH AFTER USE;    Therapy: 68SGV2959 to (Evaluate:95Apr1918)  Requested for: 18NRC1423; Last   Rx:48Jog0215 Ordered   2  Atorvastatin Calcium 20 MG Oral Tablet; take 1 tablet every day; Therapy: 77EDJ8031 to (Evaluate:12Jun2016)  Requested for: 83UDN4973; Last   Rx:39Txl2121 Ordered   3  Hydroxychloroquine Sulfate 200 MG Oral Tablet; take 1 tablet twice daily after meals    Requested for: 41Xer5445; Last Rx:09Cjq1175 Ordered   4  Methocarbamol 750 MG Oral Tablet; TAKE 1 TABLET EVERY 6 HOURS AS NEEDED; Therapy: 13Sxr3677 to (Last Rx:11Tnl2931)  Requested for: 98Lgl5077 Ordered   5  PredniSONE 5 MG Oral Tablet; take 0 5 tablet daily; Therapy: (Stephanie Litten) to Recorded   6  Rituxan 10 MG/ML CONC; USE AS DIRECTED; Therapy: 41NSE5083 to Recorded   7  Tamsulosin HCl - 0 4 MG Oral Capsule; TAKE 1 CAPSULE Bedtime; Therapy: 08KZD6168 to (Evaluate:28Jan2017)  Requested for: 19OTK9338; Last   Rx:33Cvs6955 Ordered   8  Terbinafine HCl - 250 MG Oral Tablet; TAKE 1 TABLET DAILY; Therapy: (Recorded:75Irk6262) to Recorded   9  Viagra 100 MG Oral Tablet; TAKE 1 TABLET DAILY 1 HOUR BEFORE NEEDED; Therapy: 42CAN7314 to (Evaluate:09Mar2015); Last Rx:03Mar2015 Ordered    The medication list was reviewed and updated today  Immunizations  Influenza --- Norma Grow: 33RXJ0638Ivthb Kirit: Nov 2014; Series3: Sep-2012   Pneumococcal --- Norma Grow[facility] 09DHO7305[facility]     Allergies    1  CellCept TABS   2  Avelox TABS   3  Axiron SOLN   4  Imuran TABS   5  Zithromax PACK    Vitals  Signs [Data Includes: Current Encounter]   Recorded: 19Apr2016 11:27AM   Heart Rate: 80  Systolic: 009  Diastolic: 80  Weight: 250 lb   BMI Calculated: 20 03  BSA Calculated: 1 96    Physical Exam    Constitutional   General appearance: No acute distress, well appearing and well nourished  Eyes   Conjunctiva and lids: No swelling, erythema or discharge  Pupils and irises: Equal, round and reactive to light      Ears, Nose, Mouth, and Throat   External inspection of ears and nose: Normal     Oropharynx: Abnormal   Oral mucosa was dry  Pulmonary   Respiratory effort: No increased work of breathing or signs of respiratory distress  Auscultation of lungs: Clear to auscultation  Cardiovascular   Auscultation of heart: Normal rate and rhythm, normal S1 and S2, without murmurs  Examination of extremities for edema and/or varicosities: Normal     Lymphatic   Palpation of lymph nodes in neck: No lymphadenopathy  Psychiatric   Orientation to person, place, and time: Normal     Mood and affect: Normal         Right elbow tenderness and swelling  Left Elbow swelling  Right Upper Extremity: Right Hand: Right Wrist: Right Elbow: Tenderness: lateral epicondyle  Right Shoulder:   Skin - Skin and subcutaneous tissue: Normal    Neurologic - Sensation: Normal      The patient has swelling of all PIP joints of the right hand  The patient has swelling of all PIP joints of the left hand  Results/Data  Results   * CT CHEST HIGH RESOLUTION [id number] 02:36PM Constantine Chino Order Number: [id number]   Performing Comments: ST Radha Polanco   - Patient Instructions: To schedule this appointment, please contact Central Scheduling at [phone]  Test Name Result Flag Reference   CT CHEST HIGH RESOLUTION (Report)     CT CHEST INCLUDING HIGH RESOLUTION SLICES - WITHOUT CONTRAST     INDICATION: History of NSIP by lung biopsy  Follow-up exam      COMPARISON: 9/9/2014  TECHNIQUE: CT examination of the chest was performed without intravenous contrast  Additional thin section images were performed at 10 mm intervals in supine and prone positioning in inspiration as well as supine in expiration  Examination was    performed utilizing techniques to minimize radiation dose, including the use of dose reduction software  Axial and coronal reformatted images were created  FINDINGS:     LUNGS: Stable reticular subpleural changes are again seen predominantly within the lower lobes   Right lower lobe post biopsy changes again present  Stable 3 mm linear right middle lobe nodule is noted series 4 image 44  PLEURA: Unremarkable  VESSELS: Unremarkable for patient's age  HEART: Unremarkable  MEDIASTINUM AND KEVIN: Unremarkable  CHEST WALL AND LOWER NECK: Unremarkable  VISUALIZED STRUCTURES IN THE UPPER ABDOMEN: Unremarkable  OSSEOUS STRUCTURES: No acute fracture or destructive osseous lesion  IMPRESSION:     Stable subpleural reticular changes predominantly within the lower lobes  Findings are again consistent with lung biopsy history of NSIP  Stable right middle lobe pulmonary nodule  Workstation performed: [id number]     Signed by: Jenelle Schaeffer MD   2/10/16       Future Appointments    Date/Time Provider Specialty Site   06/07/2016 03:20 PM Jann Stanford DO Rheumatology [facility]   06/13/2016 09:30 AM Artemio May, [address]  Urology [facility]     Signatures   Electronically signed by : Irma Gannon DO;  Apr 19 2016 12:51PM EST                       (Author)

## 2018-01-22 VITALS
HEART RATE: 84 BPM | DIASTOLIC BLOOD PRESSURE: 80 MMHG | SYSTOLIC BLOOD PRESSURE: 122 MMHG | HEIGHT: 74 IN | BODY MASS INDEX: 21.06 KG/M2 | WEIGHT: 164.13 LBS

## 2018-03-23 RX ORDER — SILDENAFIL 100 MG/1
1 TABLET, FILM COATED ORAL
COMMUNITY
Start: 2013-12-09 | End: 2018-03-26

## 2018-03-23 RX ORDER — TAMSULOSIN HYDROCHLORIDE 0.4 MG/1
1 CAPSULE ORAL
COMMUNITY
Start: 2015-04-27

## 2018-03-26 ENCOUNTER — OFFICE VISIT (OUTPATIENT)
Dept: FAMILY MEDICINE CLINIC | Facility: CLINIC | Age: 53
End: 2018-03-26
Payer: COMMERCIAL

## 2018-03-26 VITALS
TEMPERATURE: 96.9 F | HEIGHT: 74 IN | DIASTOLIC BLOOD PRESSURE: 90 MMHG | BODY MASS INDEX: 21.19 KG/M2 | WEIGHT: 165.13 LBS | HEART RATE: 93 BPM | SYSTOLIC BLOOD PRESSURE: 124 MMHG | RESPIRATION RATE: 17 BRPM | OXYGEN SATURATION: 99 %

## 2018-03-26 DIAGNOSIS — N52.9 ERECTILE DYSFUNCTION, UNSPECIFIED ERECTILE DYSFUNCTION TYPE: ICD-10-CM

## 2018-03-26 DIAGNOSIS — M35.00 SJOGREN'S SYNDROME, WITH UNSPECIFIED ORGAN INVOLVEMENT (HCC): ICD-10-CM

## 2018-03-26 DIAGNOSIS — I10 BENIGN ESSENTIAL HYPERTENSION: Primary | ICD-10-CM

## 2018-03-26 DIAGNOSIS — E55.9 VITAMIN D DEFICIENCY: ICD-10-CM

## 2018-03-26 DIAGNOSIS — E78.5 HYPERLIPIDEMIA, UNSPECIFIED HYPERLIPIDEMIA TYPE: ICD-10-CM

## 2018-03-26 DIAGNOSIS — Z13.29 SCREENING FOR THYROID DISORDER: ICD-10-CM

## 2018-03-26 PROCEDURE — 99214 OFFICE O/P EST MOD 30 MIN: CPT | Performed by: FAMILY MEDICINE

## 2018-03-26 PROCEDURE — 36415 COLL VENOUS BLD VENIPUNCTURE: CPT | Performed by: FAMILY MEDICINE

## 2018-03-26 RX ORDER — SILDENAFIL CITRATE 20 MG/1
TABLET ORAL
Qty: 50 TABLET | Refills: 0 | Status: SHIPPED | OUTPATIENT
Start: 2018-03-26 | End: 2018-09-27 | Stop reason: SDUPTHER

## 2018-03-26 NOTE — PROGRESS NOTES
Assessment/Plan:   1  Benign essential hypertension  Blood pressure appears stable today  Patient does follow up with Cardiology regularly  He states that he is overdue for a checkup  Continue with routine monitoring  2  Hyperlipidemia, unspecified hyperlipidemia type  Unclear of control  Recheck fasting lipid panel  Continue with a strict low-fat/low-cholesterol diet  Continue as well with current treatment of atorvastatin  - Lipid Panel with Direct LDL reflex    3  Sjogren's syndrome, with unspecified organ involvement ([address] )  Symptoms appear well controlled  Patient has not had a flare-up of this condition in over 6 months  Continue with regular follow-up with Rheumatology  - CBC and Platelet  - Comprehensive metabolic panel    4  Erectile dysfunction, unspecified erectile dysfunction type  Stable today  Continue with current treatment of sildenafil  - sildenafil (REVATIO) 20 mg tablet; Take 2-5 tablets p r n  for desired activity  Dispense: 50 tablet; Refill: 0    5  Vitamin D deficiency  Recheck vitamin-D level  Continue with daily supplementation  6  Screening for thyroid disorder    - TSH, 3rd generation with T4 reflex   Diagnoses and all orders for this visit:    Vitamin D deficiency    Hyperlipidemia, unspecified hyperlipidemia type  -     Lipid Panel with Direct LDL reflex    Benign essential hypertension    Sjogren's syndrome, with unspecified organ involvement (HCC)  -     CBC and Platelet  -     Comprehensive metabolic panel    Screening for thyroid disorder  -     TSH, 3rd generation with T4 reflex    Erectile dysfunction, unspecified erectile dysfunction type  -     sildenafil (REVATIO) 20 mg tablet; Take 2-5 tablets p r n  for desired activity  Other orders  -     Cholecalciferol 1000 units capsule; Take by mouth daily          Subjective:    Chief Complaint   Patient presents with    Follow-up     6m check up with no recent labs   pt is fasting this morning,         Patient ID: Yulia Cramer is a 46 y o  male  Patient is a 24-year-old male presents today for a follow-up on his chronic conditions  He has hypertension, dyslipidemia, Sjogren syndrome with interstitial lung disease, BPH as well as rectal dysfunction  He has been taking his medications regularly and tolerating them very well  He states he does follow up with his rheumatologist as well  He was on Plaquenil in the past however self discontinued this medication  He last has received his infusion of Rituxan over the summertime  His symptoms have been very stable  Denies flare-ups  Review of Systems   Constitutional: Negative for activity change, chills, fatigue and fever  HENT: Negative for congestion, ear pain, sinus pressure and sore throat  Eyes: Negative for redness, itching and visual disturbance  Respiratory: Negative for cough and shortness of breath  Cardiovascular: Negative for chest pain and palpitations  Gastrointestinal: Negative for abdominal pain, diarrhea and nausea  Endocrine: Negative for cold intolerance and heat intolerance  Genitourinary: Negative for dysuria, flank pain and frequency  Musculoskeletal: Negative for arthralgias, back pain, gait problem and myalgias  Skin: Negative for color change  Allergic/Immunologic: Negative for environmental allergies  Neurological: Negative for dizziness, numbness and headaches  Psychiatric/Behavioral: Negative for behavioral problems and sleep disturbance  The following portions of the patient's history were reviewed and updated as appropriate : past family history, past medical history, past social history and past surgical history      Objective:    Vitals:    03/26/18 0852   BP: 124/90   BP Location: Left arm   Patient Position: Sitting   Cuff Size: Adult   Pulse: 93   Resp: 17   Temp: (!) 96 9 °F (36 1 °C)   TempSrc: Tympanic   SpO2: 99%   Weight: 74 9 kg (165 lb 2 oz)   Height: 6' 2" (1 88 m)        Physical Exam   Constitutional: He is oriented to person, place, and time  He appears well-developed and well-nourished  HENT:   Head: Normocephalic and atraumatic  Nose: Nose normal    Mouth/Throat: No oropharyngeal exudate  Eyes: Pupils are equal, round, and reactive to light  Right eye exhibits no discharge  Left eye exhibits no discharge  Neck: Normal range of motion  Neck supple  No tracheal deviation present  Cardiovascular: Normal rate, regular rhythm and intact distal pulses  Exam reveals no gallop and no friction rub  No murmur heard  Pulses:       Dorsalis pedis pulses are 2+ on the right side, and 2+ on the left side  Posterior tibial pulses are 2+ on the right side, and 2+ on the left side  Pulmonary/Chest: Effort normal and breath sounds normal  No respiratory distress  He has no wheezes  He has no rales  Abdominal: Soft  Bowel sounds are normal  He exhibits no distension  There is no tenderness  There is no rebound and no guarding  Musculoskeletal: Normal range of motion  He exhibits no edema  Lymphadenopathy:        Head (right side): No submental and no submandibular adenopathy present  Head (left side): No submental and no submandibular adenopathy present  He has no cervical adenopathy  Right cervical: No superficial cervical, no deep cervical and no posterior cervical adenopathy present  Left cervical: No superficial cervical, no deep cervical and no posterior cervical adenopathy present  Neurological: He is alert and oriented to person, place, and time  No cranial nerve deficit or sensory deficit  Skin: Skin is warm, dry and intact  Psychiatric: His speech is normal and behavior is normal  Judgment normal  His mood appears not anxious  Cognition and memory are normal  He does not exhibit a depressed mood  Vitals reviewed

## 2018-03-28 LAB
ALBUMIN SERPL-MCNC: 4.6 G/DL (ref 3.6–5.1)
ALBUMIN/GLOB SERPL: 2.4 (CALC) (ref 1–2.5)
ALP SERPL-CCNC: 52 U/L (ref 40–115)
ALT SERPL-CCNC: 19 U/L (ref 9–46)
AST SERPL-CCNC: 19 U/L (ref 10–35)
BILIRUB SERPL-MCNC: 0.6 MG/DL (ref 0.2–1.2)
BUN SERPL-MCNC: 18 MG/DL (ref 7–25)
BUN/CREAT SERPL: NORMAL (CALC) (ref 6–22)
CALCIUM SERPL-MCNC: 9.7 MG/DL (ref 8.6–10.3)
CHLORIDE SERPL-SCNC: 106 MMOL/L (ref 98–110)
CHOLEST SERPL-MCNC: 161 MG/DL
CHOLEST/HDLC SERPL: 2.7 (CALC)
CO2 SERPL-SCNC: 21 MMOL/L (ref 20–31)
CREAT SERPL-MCNC: 0.85 MG/DL (ref 0.7–1.33)
ERYTHROCYTE [DISTWIDTH] IN BLOOD BY AUTOMATED COUNT: 13.2 % (ref 11–15)
GLOBULIN SER CALC-MCNC: 1.9 G/DL (CALC) (ref 1.9–3.7)
GLUCOSE SERPL-MCNC: 88 MG/DL (ref 65–99)
HCT VFR BLD AUTO: 48.8 % (ref 38.5–50)
HDLC SERPL-MCNC: 59 MG/DL
HGB BLD-MCNC: 16 G/DL (ref 13.2–17.1)
LDLC SERPL CALC-MCNC: 83 MG/DL (CALC)
MCH RBC QN AUTO: 29.6 PG (ref 27–33)
MCHC RBC AUTO-ENTMCNC: 32.8 G/DL (ref 32–36)
MCV RBC AUTO: 90.2 FL (ref 80–100)
NONHDLC SERPL-MCNC: 102 MG/DL (CALC)
PLATELET # BLD AUTO: 266 THOUSAND/UL (ref 140–400)
PMV BLD REES-ECKER: 9.8 FL (ref 7.5–12.5)
POTASSIUM SERPL-SCNC: 4.5 MMOL/L (ref 3.5–5.3)
PROT SERPL-MCNC: 6.5 G/DL (ref 6.1–8.1)
RBC # BLD AUTO: 5.41 MILLION/UL (ref 4.2–5.8)
SL AMB EGFR AFRICAN AMERICAN: 116 ML/MIN/1.73M2
SL AMB EGFR NON AFRICAN AMERICAN: 100 ML/MIN/1.73M2
SODIUM SERPL-SCNC: 140 MMOL/L (ref 135–146)
TRIGL SERPL-MCNC: 92 MG/DL
TSH SERPL-ACNC: 0.93 MIU/L (ref 0.4–4.5)
WBC # BLD AUTO: 5.2 THOUSAND/UL (ref 3.8–10.8)

## 2018-09-12 ENCOUNTER — OFFICE VISIT (OUTPATIENT)
Dept: FAMILY MEDICINE CLINIC | Facility: CLINIC | Age: 53
End: 2018-09-12
Payer: COMMERCIAL

## 2018-09-12 VITALS
TEMPERATURE: 97.2 F | WEIGHT: 174.38 LBS | HEART RATE: 83 BPM | RESPIRATION RATE: 18 BRPM | SYSTOLIC BLOOD PRESSURE: 128 MMHG | BODY MASS INDEX: 22.39 KG/M2 | OXYGEN SATURATION: 98 % | DIASTOLIC BLOOD PRESSURE: 86 MMHG

## 2018-09-12 DIAGNOSIS — N41.0 ACUTE PROSTATITIS: ICD-10-CM

## 2018-09-12 DIAGNOSIS — N39.0 URINARY TRACT INFECTION WITHOUT HEMATURIA, SITE UNSPECIFIED: Primary | ICD-10-CM

## 2018-09-12 LAB
SL AMB  POCT GLUCOSE, UA: NEGATIVE
SL AMB LEUKOCYTE ESTERASE,UA: ABNORMAL
SL AMB POCT BILIRUBIN,UA: NEGATIVE
SL AMB POCT BLOOD,UA: ABNORMAL
SL AMB POCT CLARITY,UA: CLEAR
SL AMB POCT COLOR,UA: YELLOW
SL AMB POCT KETONES,UA: NEGATIVE
SL AMB POCT NITRITE,UA: NEGATIVE
SL AMB POCT PH,UA: 7.5
SL AMB POCT SPECIFIC GRAVITY,UA: 1.01
SL AMB POCT URINE PROTEIN: NEGATIVE
SL AMB POCT UROBILINOGEN: 0.2

## 2018-09-12 PROCEDURE — 81003 URINALYSIS AUTO W/O SCOPE: CPT | Performed by: FAMILY MEDICINE

## 2018-09-12 PROCEDURE — 99213 OFFICE O/P EST LOW 20 MIN: CPT | Performed by: FAMILY MEDICINE

## 2018-09-12 RX ORDER — SULFAMETHOXAZOLE AND TRIMETHOPRIM 800; 160 MG/1; MG/1
1 TABLET ORAL EVERY 12 HOURS SCHEDULED
Qty: 28 TABLET | Refills: 0 | Status: SHIPPED | OUTPATIENT
Start: 2018-09-12 | End: 2018-09-27

## 2018-09-12 NOTE — PROGRESS NOTES
Assessment/Plan:    Possible urinary tract infection versus acute prostatitis  Treat with Bactrim for 14 days unless culture returns improving a simple UTI in which case can discontinue after 7 days  Diagnoses and all orders for this visit:    Urinary tract infection without hematuria, site unspecified  -     sulfamethoxazole-trimethoprim (BACTRIM DS) 800-160 mg per tablet; Take 1 tablet by mouth every 12 (twelve) hours for 14 days  -     POCT urine dip auto non-scope  -     Urine culture    Acute prostatitis  -     sulfamethoxazole-trimethoprim (BACTRIM DS) 800-160 mg per tablet; Take 1 tablet by mouth every 12 (twelve) hours for 14 days  -     POCT urine dip auto non-scope  -     Urine culture          Subjective:      Patient ID: Sudhir Negro is a 48 y o  male  Patient complains of urinary frequency with some urgency over the last 24-48 hours  He did have slight blood tinged discharge this morning  Denies fever chills nausea vomiting or flank pain  Does have a history BPH for which she takes Flomax  The following portions of the patient's history were reviewed and updated as appropriate: allergies, current medications, past family history, past medical history, past social history, past surgical history and problem list     Review of Systems   Genitourinary: Positive for difficulty urinating, frequency and hematuria  Objective:      /86 (BP Location: Left arm, Patient Position: Sitting, Cuff Size: Adult)   Pulse 83   Temp (!) 97 2 °F (36 2 °C) (Tympanic)   Resp 18   Wt 79 1 kg (174 lb 6 oz)   SpO2 98%   BMI 22 39 kg/m²          Physical Exam   Constitutional: He is oriented to person, place, and time  He appears well-developed and well-nourished  No distress  HENT:   Head: Normocephalic and atraumatic  Eyes: Conjunctivae are normal  Pupils are equal, round, and reactive to light  Right eye exhibits no discharge  Neck: Normal range of motion   No thyromegaly present  Cardiovascular: Normal rate and regular rhythm  Pulmonary/Chest: Effort normal and breath sounds normal  No respiratory distress  Lymphadenopathy:     He has no cervical adenopathy  Neurological: He is alert and oriented to person, place, and time  Skin: Skin is warm and dry  He is not diaphoretic  Psychiatric: He has a normal mood and affect  His behavior is normal  Judgment and thought content normal    Nursing note and vitals reviewed

## 2018-09-27 ENCOUNTER — OFFICE VISIT (OUTPATIENT)
Dept: FAMILY MEDICINE CLINIC | Facility: CLINIC | Age: 53
End: 2018-09-27
Payer: COMMERCIAL

## 2018-09-27 VITALS
HEIGHT: 74 IN | TEMPERATURE: 96 F | SYSTOLIC BLOOD PRESSURE: 120 MMHG | WEIGHT: 169.6 LBS | DIASTOLIC BLOOD PRESSURE: 68 MMHG | RESPIRATION RATE: 17 BRPM | BODY MASS INDEX: 21.77 KG/M2 | OXYGEN SATURATION: 97 % | HEART RATE: 88 BPM

## 2018-09-27 DIAGNOSIS — Z23 NEED FOR TDAP VACCINATION: ICD-10-CM

## 2018-09-27 DIAGNOSIS — N52.9 ERECTILE DYSFUNCTION, UNSPECIFIED ERECTILE DYSFUNCTION TYPE: ICD-10-CM

## 2018-09-27 DIAGNOSIS — E78.5 HYPERLIPIDEMIA, UNSPECIFIED HYPERLIPIDEMIA TYPE: Primary | ICD-10-CM

## 2018-09-27 DIAGNOSIS — M35.00 SJOGREN'S SYNDROME, WITH UNSPECIFIED ORGAN INVOLVEMENT (HCC): ICD-10-CM

## 2018-09-27 DIAGNOSIS — N40.0 ENLARGED PROSTATE WITHOUT LOWER URINARY TRACT SYMPTOMS (LUTS): ICD-10-CM

## 2018-09-27 PROCEDURE — 99214 OFFICE O/P EST MOD 30 MIN: CPT | Performed by: FAMILY MEDICINE

## 2018-09-27 PROCEDURE — 90715 TDAP VACCINE 7 YRS/> IM: CPT | Performed by: FAMILY MEDICINE

## 2018-09-27 PROCEDURE — 1036F TOBACCO NON-USER: CPT | Performed by: FAMILY MEDICINE

## 2018-09-27 PROCEDURE — 90471 IMMUNIZATION ADMIN: CPT | Performed by: FAMILY MEDICINE

## 2018-09-27 RX ORDER — SILDENAFIL CITRATE 20 MG/1
TABLET ORAL
Qty: 50 TABLET | Refills: 0 | Status: SHIPPED | OUTPATIENT
Start: 2018-09-27

## 2018-09-27 NOTE — PROGRESS NOTES
Assessment/Plan:   1  Hyperlipidemia, unspecified hyperlipidemia type  Patient's previous fasting lipid panel appeared very well controlled  He has been exercising regularly as well checking a strict diet  At this time, will check his fasting lipid panel once per year  He will be due for dot in early spring  Continue with current treatment of Lipitor  2  Enlarged prostate without lower urinary tract symptoms (luts)  Symptoms have been very well controlled  He denies any urinary symptoms today  Will continue with his current treatment of Flomax  3  Sjogren's syndrome, with unspecified organ involvement [facility])  Patient denies any exacerbations/flare-ups of his Sjogren's  He does follow up regularly with his rheumatologist as well as pulmonologist   He was previously on treatments of Rituxan however has not needed this in over a year  Will continue with routine follow-up  4  Erectile dysfunction, unspecified erectile dysfunction type  Patient states that he did not try his sildenafil dot at this time, prescription was recent  Follow up if any symptoms are worsening  Follow-up in 6 months  - sildenafil (REVATIO) 20 mg tablet; Take 2-5 tablets p r n  for desired activity  Dispense: 50 tablet; Refill: 0     Diagnoses and all orders for this visit:    Benign essential hypertension    Hyperlipidemia, unspecified hyperlipidemia type    Enlarged prostate without lower urinary tract symptoms (luts)    Sjogren's syndrome, with unspecified organ involvement [facility])          Subjective:    Chief Complaint   Patient presents with    Follow-up        Patient ID: Ziggy Reeves is a 48 y o  male  Patient is a 26-year-old male presents today for follow-up on his chronic conditions  He has dyslipidemia, BPH, erectile dysfunction as well as Sjogren syndrome  He states that he has been taking his medications regularly and tolerating them very well  Denies adverse reactions with his medications    He has not had any infusion for sure ribs in over a year  He states that his symptoms have been stable  He does follow up with his rheumatologist as well as pulmonologist         Review of Systems   Constitutional: Negative for activity change, chills, fatigue and fever  HENT: Negative for congestion, ear pain, sinus pressure and sore throat  Eyes: Negative for redness, itching and visual disturbance  Respiratory: Negative for cough and shortness of breath  Cardiovascular: Negative for chest pain and palpitations  Gastrointestinal: Negative for abdominal pain, diarrhea and nausea  Endocrine: Negative for cold intolerance and heat intolerance  Genitourinary: Negative for dysuria, flank pain and frequency  Musculoskeletal: Negative for arthralgias, back pain, gait problem and myalgias  Skin: Negative for color change  Allergic/Immunologic: Negative for environmental allergies  Neurological: Negative for dizziness, numbness and headaches  Psychiatric/Behavioral: Negative for behavioral problems and sleep disturbance  The following portions of the patient's history were reviewed and updated as appropriate : past family history, past medical history, past social history and past surgical history  Current Outpatient Prescriptions:     atorvastatin (LIPITOR) 20 mg tablet, Atorvastatin Calcium 20 MG Oral Tablet take 1 tablet every day  Quantity: 90;  Refills: 1    Blanca Bauer DO;  Started 2-July-2013 Active, Disp: , Rfl:     Cholecalciferol 1000 units capsule, Take by mouth daily, Disp: , Rfl:     hydroxychloroquine (PLAQUENIL) 200 mg tablet, Hydroxychloroquine Sulfate 200 MG Oral Tablet take 1 tablet twice daily after meals  Quantity: 180;  Refills: 1    Dionne Keys DO; Active, Disp: , Rfl:     riTUXimab (RITUXAN) 500 mg/50 mL, Infuse 10 mg into a venous catheter as needed, Disp: , Rfl:     sildenafil (REVATIO) 20 mg tablet, Take 2-5 tablets p r n  for desired activity  , Disp: 50 tablet, Rfl: 0    tamsulosin (FLOMAX) 0 4 mg, Take 1 capsule by mouth daily at bedtime, Disp: , Rfl:     Objective:    Vitals:    09/27/18 0804   BP: 120/68   BP Location: Left arm   Patient Position: Sitting   Cuff Size: Adult   Pulse: 88   Resp: 17   Temp: (!) 96 °F (35 6 °C)   TempSrc: Tympanic   SpO2: 97%   Weight: 76 9 kg (169 lb 9 6 oz)   Height: 6' 1 5" (1 867 m)        Physical Exam   Constitutional: He is oriented to person, place, and time  He appears well-developed and well-nourished  HENT:   Head: Normocephalic and atraumatic  Nose: Nose normal    Mouth/Throat: No oropharyngeal exudate  Eyes: Pupils are equal, round, and reactive to light  Right eye exhibits no discharge  Left eye exhibits no discharge  Neck: Normal range of motion  Neck supple  No tracheal deviation present  Cardiovascular: Normal rate, regular rhythm and intact distal pulses  Exam reveals no gallop and no friction rub  No murmur heard  Pulses:       Dorsalis pedis pulses are 2+ on the right side, and 2+ on the left side  Posterior tibial pulses are 2+ on the right side, and 2+ on the left side  Pulmonary/Chest: Effort normal and breath sounds normal  No respiratory distress  He has no wheezes  He has no rales  Abdominal: Soft  Bowel sounds are normal  He exhibits no distension  There is no tenderness  There is no rebound and no guarding  Musculoskeletal: Normal range of motion  He exhibits no edema  Lymphadenopathy:        Head (right side): No submental and no submandibular adenopathy present  Head (left side): No submental and no submandibular adenopathy present  He has no cervical adenopathy  Right cervical: No superficial cervical, no deep cervical and no posterior cervical adenopathy present  Left cervical: No superficial cervical, no deep cervical and no posterior cervical adenopathy present  Neurological: He is alert and oriented to person, place, and time   No cranial nerve deficit or sensory deficit  Skin: Skin is warm, dry and intact  Psychiatric: His speech is normal and behavior is normal  Judgment normal  His mood appears not anxious  Cognition and memory are normal  He does not exhibit a depressed mood  Vitals reviewed

## 2023-01-05 ENCOUNTER — TELEPHONE (OUTPATIENT)
Dept: UROLOGY | Facility: AMBULATORY SURGERY CENTER | Age: 58
End: 2023-01-05

## 2023-01-05 NOTE — TELEPHONE ENCOUNTER
Pt called and left VM  Stating Pt was seen 2016 and asking for c/b to have records transferred to dr in [address] [facility]     Pt call [phone]

## 2023-01-10 NOTE — TELEPHONE ENCOUNTER
Patient returned call, records should be faxed to Dr Gricelda Brumfield at [phone]  Records faxed and confirmation received

## (undated) DEVICE — SINGLE-USE BIOPSY FORCEPS: Brand: RADIAL JAW 4